# Patient Record
Sex: MALE | Race: WHITE | NOT HISPANIC OR LATINO | Employment: OTHER | URBAN - METROPOLITAN AREA
[De-identification: names, ages, dates, MRNs, and addresses within clinical notes are randomized per-mention and may not be internally consistent; named-entity substitution may affect disease eponyms.]

---

## 2018-01-01 ENCOUNTER — HOSPITAL ENCOUNTER (INPATIENT)
Facility: HOSPITAL | Age: 78
LOS: 1 days | DRG: 296 | End: 2018-12-30
Attending: EMERGENCY MEDICINE | Admitting: INTERNAL MEDICINE
Payer: MEDICARE

## 2018-01-01 ENCOUNTER — OFFICE VISIT (OUTPATIENT)
Dept: OPTOMETRY | Facility: CLINIC | Age: 78
End: 2018-01-01
Payer: MEDICARE

## 2018-01-01 VITALS
TEMPERATURE: 95 F | BODY MASS INDEX: 24.34 KG/M2 | SYSTOLIC BLOOD PRESSURE: 108 MMHG | OXYGEN SATURATION: 100 % | HEIGHT: 70 IN | WEIGHT: 170 LBS | DIASTOLIC BLOOD PRESSURE: 70 MMHG

## 2018-01-01 DIAGNOSIS — E87.20 ACIDEMIA: ICD-10-CM

## 2018-01-01 DIAGNOSIS — Z71.89 GOALS OF CARE, COUNSELING/DISCUSSION: ICD-10-CM

## 2018-01-01 DIAGNOSIS — R41.82 ALTERED MENTAL STATUS: ICD-10-CM

## 2018-01-01 DIAGNOSIS — J96.90 RESPIRATORY FAILURE, UNSPECIFIED CHRONICITY, UNSPECIFIED WHETHER WITH HYPOXIA OR HYPERCAPNIA: ICD-10-CM

## 2018-01-01 DIAGNOSIS — R56.9 SEIZURE IN RESPONSE TO ACUTE EVENT: ICD-10-CM

## 2018-01-01 DIAGNOSIS — E87.20 LACTIC ACIDOSIS: ICD-10-CM

## 2018-01-01 DIAGNOSIS — H11.31 SUBCONJUNCTIVAL HEMORRHAGE OF RIGHT EYE: Primary | ICD-10-CM

## 2018-01-01 DIAGNOSIS — F10.929 ALCOHOLIC INTOXICATION WITH COMPLICATION: ICD-10-CM

## 2018-01-01 DIAGNOSIS — G93.40 ACUTE ENCEPHALOPATHY: ICD-10-CM

## 2018-01-01 DIAGNOSIS — J96.90 RESPIRATORY FAILURE REQUIRING INTUBATION: ICD-10-CM

## 2018-01-01 DIAGNOSIS — E87.29 HIGH ANION GAP METABOLIC ACIDOSIS: ICD-10-CM

## 2018-01-01 DIAGNOSIS — I46.9 CARDIAC ARREST: Primary | ICD-10-CM

## 2018-01-01 LAB
ALBUMIN SERPL BCP-MCNC: 2.6 G/DL
ALBUMIN SERPL BCP-MCNC: 2.6 G/DL
ALBUMIN SERPL BCP-MCNC: 3.1 G/DL
ALLENS TEST: ABNORMAL
ALP SERPL-CCNC: 63 U/L
ALP SERPL-CCNC: 75 U/L
ALP SERPL-CCNC: 99 U/L
ALT SERPL W/O P-5'-P-CCNC: 35 U/L
ALT SERPL W/O P-5'-P-CCNC: 36 U/L
ALT SERPL W/O P-5'-P-CCNC: 48 U/L
AMPHET+METHAMPHET UR QL: NEGATIVE
ANION GAP SERPL CALC-SCNC: 11 MMOL/L
ANION GAP SERPL CALC-SCNC: 13 MMOL/L
ANION GAP SERPL CALC-SCNC: 20 MMOL/L
ANION GAP SERPL CALC-SCNC: 9 MMOL/L
ANISOCYTOSIS BLD QL SMEAR: SLIGHT
APTT BLDCRRT: 28.2 SEC
AST SERPL-CCNC: 102 U/L
AST SERPL-CCNC: 71 U/L
AST SERPL-CCNC: 77 U/L
BACTERIA #/AREA URNS AUTO: ABNORMAL /HPF
BARBITURATES UR QL SCN>200 NG/ML: NEGATIVE
BASOPHILS # BLD AUTO: 0.02 K/UL
BASOPHILS # BLD AUTO: 0.07 K/UL
BASOPHILS NFR BLD: 0.8 %
BASOPHILS NFR BLD: 1 %
BASOPHILS NFR BLD: 1.1 %
BENZODIAZ UR QL SCN>200 NG/ML: NEGATIVE
BILIRUB DIRECT SERPL-MCNC: 0.6 MG/DL
BILIRUB SERPL-MCNC: 1 MG/DL
BILIRUB SERPL-MCNC: 1 MG/DL
BILIRUB SERPL-MCNC: 1.4 MG/DL
BILIRUB UR QL STRIP: NEGATIVE
BUN SERPL-MCNC: 11 MG/DL
BUN SERPL-MCNC: 11 MG/DL
BUN SERPL-MCNC: 12 MG/DL
BUN SERPL-MCNC: 13 MG/DL
BZE UR QL SCN: NEGATIVE
CA-I BLDV-SCNC: 1.24 MMOL/L
CALCIUM SERPL-MCNC: 7.8 MG/DL
CALCIUM SERPL-MCNC: 7.9 MG/DL
CALCIUM SERPL-MCNC: 8.2 MG/DL
CALCIUM SERPL-MCNC: 8.7 MG/DL
CANNABINOIDS UR QL SCN: NEGATIVE
CHLORIDE SERPL-SCNC: 101 MMOL/L
CHLORIDE SERPL-SCNC: 102 MMOL/L
CHLORIDE SERPL-SCNC: 104 MMOL/L
CHLORIDE SERPL-SCNC: 104 MMOL/L
CK SERPL-CCNC: 332 U/L
CLARITY UR REFRACT.AUTO: ABNORMAL
CO2 SERPL-SCNC: 16 MMOL/L
CO2 SERPL-SCNC: 20 MMOL/L
CO2 SERPL-SCNC: 21 MMOL/L
CO2 SERPL-SCNC: 21 MMOL/L
COLOR UR AUTO: YELLOW
CREAT SERPL-MCNC: 0.9 MG/DL
CREAT SERPL-MCNC: 0.9 MG/DL
CREAT SERPL-MCNC: 1 MG/DL
CREAT SERPL-MCNC: 1.1 MG/DL
CREAT UR-MCNC: 21 MG/DL
DELSYS: ABNORMAL
DIFFERENTIAL METHOD: ABNORMAL
EOSINOPHIL # BLD AUTO: 0 K/UL
EOSINOPHIL # BLD AUTO: 0.1 K/UL
EOSINOPHIL NFR BLD: 0 %
EOSINOPHIL NFR BLD: 0 %
EOSINOPHIL NFR BLD: 0.9 %
ERYTHROCYTE [DISTWIDTH] IN BLOOD BY AUTOMATED COUNT: 13.1 %
ERYTHROCYTE [DISTWIDTH] IN BLOOD BY AUTOMATED COUNT: 13.2 %
ERYTHROCYTE [DISTWIDTH] IN BLOOD BY AUTOMATED COUNT: 13.3 %
ERYTHROCYTE [SEDIMENTATION RATE] IN BLOOD BY WESTERGREN METHOD: 14 MM/H
ERYTHROCYTE [SEDIMENTATION RATE] IN BLOOD BY WESTERGREN METHOD: 20 MM/H
ERYTHROCYTE [SEDIMENTATION RATE] IN BLOOD BY WESTERGREN METHOD: 28 MM/H
ERYTHROCYTE [SEDIMENTATION RATE] IN BLOOD BY WESTERGREN METHOD: 30 MM/H
EST. GFR  (AFRICAN AMERICAN): >60 ML/MIN/1.73 M^2
EST. GFR  (NON AFRICAN AMERICAN): >60 ML/MIN/1.73 M^2
ETHANOL SERPL-MCNC: 114 MG/DL
ETHANOL UR-MCNC: 106 MG/DL
FIO2: 100
FIO2: 40
FIO2: 40
FIO2: 50
GLUCOSE SERPL-MCNC: 103 MG/DL
GLUCOSE SERPL-MCNC: 127 MG/DL
GLUCOSE SERPL-MCNC: 90 MG/DL
GLUCOSE SERPL-MCNC: 94 MG/DL
GLUCOSE UR QL STRIP: ABNORMAL
HCO3 UR-SCNC: 15.5 MMOL/L (ref 24–28)
HCO3 UR-SCNC: 16.5 MMOL/L (ref 24–28)
HCO3 UR-SCNC: 17.5 MMOL/L (ref 24–28)
HCO3 UR-SCNC: 19.6 MMOL/L (ref 24–28)
HCO3 UR-SCNC: 21.1 MMOL/L (ref 24–28)
HCT VFR BLD AUTO: 41.9 %
HCT VFR BLD AUTO: 44.3 %
HCT VFR BLD AUTO: 45.9 %
HGB BLD-MCNC: 14.3 G/DL
HGB BLD-MCNC: 14.4 G/DL
HGB BLD-MCNC: 14.6 G/DL
HGB UR QL STRIP: ABNORMAL
HYALINE CASTS UR QL AUTO: 0 /LPF
IMM GRANULOCYTES # BLD AUTO: 0.02 K/UL
IMM GRANULOCYTES # BLD AUTO: 0.28 K/UL
IMM GRANULOCYTES # BLD AUTO: ABNORMAL K/UL
IMM GRANULOCYTES NFR BLD AUTO: 1.1 %
IMM GRANULOCYTES NFR BLD AUTO: 3.1 %
IMM GRANULOCYTES NFR BLD AUTO: ABNORMAL %
INR PPP: 1
KETONES UR QL STRIP: NEGATIVE
LACTATE SERPL-SCNC: 4.9 MMOL/L
LACTATE SERPL-SCNC: 5.3 MMOL/L
LACTATE SERPL-SCNC: 6.7 MMOL/L
LEUKOCYTE ESTERASE UR QL STRIP: NEGATIVE
LYMPHOCYTES # BLD AUTO: 0.3 K/UL
LYMPHOCYTES # BLD AUTO: 3 K/UL
LYMPHOCYTES NFR BLD: 15.8 %
LYMPHOCYTES NFR BLD: 22 %
LYMPHOCYTES NFR BLD: 34 %
MAGNESIUM SERPL-MCNC: 1.3 MG/DL
MAGNESIUM SERPL-MCNC: 1.7 MG/DL
MAGNESIUM SERPL-MCNC: 2.6 MG/DL
MCH RBC QN AUTO: 31.5 PG
MCH RBC QN AUTO: 32.1 PG
MCH RBC QN AUTO: 32.6 PG
MCHC RBC AUTO-ENTMCNC: 31.8 G/DL
MCHC RBC AUTO-ENTMCNC: 32.5 G/DL
MCHC RBC AUTO-ENTMCNC: 34.1 G/DL
MCV RBC AUTO: 103 FL
MCV RBC AUTO: 94 FL
MCV RBC AUTO: 97 FL
METHADONE UR QL SCN>300 NG/ML: NEGATIVE
MICROSCOPIC COMMENT: ABNORMAL
MIN VOL: 12.4
MIN VOL: 14.6
MODE: ABNORMAL
MONOCYTES # BLD AUTO: 0.1 K/UL
MONOCYTES # BLD AUTO: 0.4 K/UL
MONOCYTES NFR BLD: 4.4 %
MONOCYTES NFR BLD: 7.4 %
MONOCYTES NFR BLD: 8 %
NEUTROPHILS # BLD AUTO: 1.4 K/UL
NEUTROPHILS # BLD AUTO: 5.1 K/UL
NEUTROPHILS NFR BLD: 55 %
NEUTROPHILS NFR BLD: 56.8 %
NEUTROPHILS NFR BLD: 74.6 %
NEUTS BAND NFR BLD MANUAL: 14 %
NITRITE UR QL STRIP: NEGATIVE
NRBC BLD-RTO: 0 /100 WBC
OPIATES UR QL SCN: NEGATIVE
PCO2 BLDA: 27.4 MMHG (ref 35–45)
PCO2 BLDA: 47.7 MMHG (ref 35–45)
PCO2 BLDA: 51 MMHG (ref 35–45)
PCO2 BLDA: 54.3 MMHG (ref 35–45)
PCO2 BLDA: 77.2 MMHG (ref 35–45)
PCP UR QL SCN>25 NG/ML: NEGATIVE
PEEP: 5
PH SMN: 6.94 [PH] (ref 7.35–7.45)
PH SMN: 7.12 [PH] (ref 7.35–7.45)
PH SMN: 7.17 [PH] (ref 7.35–7.45)
PH SMN: 7.22 [PH] (ref 7.35–7.45)
PH SMN: 7.41 [PH] (ref 7.35–7.45)
PH UR STRIP: 6 [PH] (ref 5–8)
PHOSPHATE SERPL-MCNC: 1.1 MG/DL
PHOSPHATE SERPL-MCNC: 3.7 MG/DL
PIP: 25
PIP: 26
PLATELET # BLD AUTO: 112 K/UL
PLATELET # BLD AUTO: 121 K/UL
PLATELET # BLD AUTO: 150 K/UL
PLATELET BLD QL SMEAR: ABNORMAL
PMV BLD AUTO: 10 FL
PMV BLD AUTO: 10.3 FL
PMV BLD AUTO: 9.9 FL
PO2 BLDA: 22 MMHG (ref 40–60)
PO2 BLDA: 24 MMHG (ref 40–60)
PO2 BLDA: 407 MMHG (ref 80–100)
PO2 BLDA: 73 MMHG (ref 80–100)
PO2 BLDA: 83 MMHG (ref 80–100)
POC BE: -14 MMOL/L
POC BE: -16 MMOL/L
POC BE: -7 MMOL/L
POC BE: -7 MMOL/L
POC BE: -9 MMOL/L
POC SATURATED O2: 100 % (ref 95–100)
POC SATURATED O2: 27 % (ref 95–100)
POC SATURATED O2: 28 % (ref 95–100)
POC SATURATED O2: 92 % (ref 95–100)
POC SATURATED O2: 95 % (ref 95–100)
POC TCO2: 17 MMOL/L (ref 23–27)
POC TCO2: 18 MMOL/L (ref 23–27)
POC TCO2: 19 MMOL/L (ref 23–27)
POC TCO2: 21 MMOL/L (ref 24–29)
POC TCO2: 23 MMOL/L (ref 24–29)
POTASSIUM SERPL-SCNC: 3.8 MMOL/L
POTASSIUM SERPL-SCNC: 3.8 MMOL/L
POTASSIUM SERPL-SCNC: 3.9 MMOL/L
POTASSIUM SERPL-SCNC: 4 MMOL/L
PROCALCITONIN SERPL IA-MCNC: 8.58 NG/ML
PROT SERPL-MCNC: 5.2 G/DL
PROT SERPL-MCNC: 5.2 G/DL
PROT SERPL-MCNC: 6.4 G/DL
PROT UR QL STRIP: ABNORMAL
PROTHROMBIN TIME: 10.7 SEC
PROVIDER CREDENTIALS: ABNORMAL
PROVIDER CREDENTIALS: ABNORMAL
PROVIDER NOTIFIED: ABNORMAL
PROVIDER NOTIFIED: ABNORMAL
RBC # BLD AUTO: 4.45 M/UL
RBC # BLD AUTO: 4.48 M/UL
RBC # BLD AUTO: 4.57 M/UL
RBC #/AREA URNS AUTO: 87 /HPF (ref 0–4)
SAMPLE: ABNORMAL
SITE: ABNORMAL
SODIUM SERPL-SCNC: 134 MMOL/L
SODIUM SERPL-SCNC: 135 MMOL/L
SODIUM SERPL-SCNC: 136 MMOL/L
SODIUM SERPL-SCNC: 137 MMOL/L
SP GR UR STRIP: 1 (ref 1–1.03)
SP02: 96
SP02: 98
SQUAMOUS #/AREA URNS AUTO: 2 /HPF
TOXICOLOGY INFORMATION: ABNORMAL
TROPONIN I SERPL DL<=0.01 NG/ML-MCNC: 1.04 NG/ML
TROPONIN I SERPL DL<=0.01 NG/ML-MCNC: 1.76 NG/ML
TROPONIN I SERPL DL<=0.01 NG/ML-MCNC: <0.006 NG/ML
URN SPEC COLLECT METH UR: ABNORMAL
VANCOMYCIN SERPL-MCNC: 24.4 UG/ML
VT: 450
VT: 470
WBC # BLD AUTO: 1.05 K/UL
WBC # BLD AUTO: 1.9 K/UL
WBC # BLD AUTO: 8.91 K/UL
WBC #/AREA URNS AUTO: 0 /HPF (ref 0–5)

## 2018-01-01 PROCEDURE — 36600 WITHDRAWAL OF ARTERIAL BLOOD: CPT

## 2018-01-01 PROCEDURE — 99202 OFFICE O/P NEW SF 15 MIN: CPT | Mod: PBBFAC | Performed by: OPTOMETRIST

## 2018-01-01 PROCEDURE — 27100171 HC OXYGEN HIGH FLOW UP TO 24 HOURS

## 2018-01-01 PROCEDURE — 83735 ASSAY OF MAGNESIUM: CPT

## 2018-01-01 PROCEDURE — 63600175 PHARM REV CODE 636 W HCPCS: Performed by: STUDENT IN AN ORGANIZED HEALTH CARE EDUCATION/TRAINING PROGRAM

## 2018-01-01 PROCEDURE — 99292 CRITICAL CARE ADDL 30 MIN: CPT | Mod: ,,, | Performed by: EMERGENCY MEDICINE

## 2018-01-01 PROCEDURE — 82550 ASSAY OF CK (CPK): CPT

## 2018-01-01 PROCEDURE — 94761 N-INVAS EAR/PLS OXIMETRY MLT: CPT

## 2018-01-01 PROCEDURE — 99291 PR CRITICAL CARE, E/M 30-74 MINUTES: ICD-10-PCS | Mod: ,,, | Performed by: CLINICAL NURSE SPECIALIST

## 2018-01-01 PROCEDURE — 25000003 PHARM REV CODE 250: Performed by: CLINICAL NURSE SPECIALIST

## 2018-01-01 PROCEDURE — 83605 ASSAY OF LACTIC ACID: CPT

## 2018-01-01 PROCEDURE — 92002 INTRM OPH EXAM NEW PATIENT: CPT | Mod: S$GLB,,, | Performed by: OPTOMETRIST

## 2018-01-01 PROCEDURE — 99291 CRITICAL CARE FIRST HOUR: CPT | Mod: 25

## 2018-01-01 PROCEDURE — 93010 EKG 12-LEAD: ICD-10-PCS | Mod: ,,, | Performed by: INTERNAL MEDICINE

## 2018-01-01 PROCEDURE — 82803 BLOOD GASES ANY COMBINATION: CPT

## 2018-01-01 PROCEDURE — 63600175 PHARM REV CODE 636 W HCPCS: Performed by: CLINICAL NURSE SPECIALIST

## 2018-01-01 PROCEDURE — 99291 PR CRITICAL CARE, E/M 30-74 MINUTES: ICD-10-PCS | Mod: ,,, | Performed by: EMERGENCY MEDICINE

## 2018-01-01 PROCEDURE — 84484 ASSAY OF TROPONIN QUANT: CPT | Mod: 91

## 2018-01-01 PROCEDURE — 20000000 HC ICU ROOM

## 2018-01-01 PROCEDURE — 99900035 HC TECH TIME PER 15 MIN (STAT)

## 2018-01-01 PROCEDURE — 84484 ASSAY OF TROPONIN QUANT: CPT

## 2018-01-01 PROCEDURE — 80048 BASIC METABOLIC PNL TOTAL CA: CPT | Mod: 91

## 2018-01-01 PROCEDURE — 84100 ASSAY OF PHOSPHORUS: CPT

## 2018-01-01 PROCEDURE — 85025 COMPLETE CBC W/AUTO DIFF WBC: CPT

## 2018-01-01 PROCEDURE — 99291 CRITICAL CARE FIRST HOUR: CPT | Mod: ,,, | Performed by: EMERGENCY MEDICINE

## 2018-01-01 PROCEDURE — 25500020 PHARM REV CODE 255: Performed by: EMERGENCY MEDICINE

## 2018-01-01 PROCEDURE — 95951 PR EEG MONITORING/VIDEORECORD: ICD-10-PCS | Mod: 26,,, | Performed by: PSYCHIATRY & NEUROLOGY

## 2018-01-01 PROCEDURE — 25000003 PHARM REV CODE 250: Performed by: EMERGENCY MEDICINE

## 2018-01-01 PROCEDURE — 27000221 HC OXYGEN, UP TO 24 HOURS

## 2018-01-01 PROCEDURE — 25000003 PHARM REV CODE 250

## 2018-01-01 PROCEDURE — 80320 DRUG SCREEN QUANTALCOHOLS: CPT

## 2018-01-01 PROCEDURE — 80307 DRUG TEST PRSMV CHEM ANLYZR: CPT

## 2018-01-01 PROCEDURE — 99292 PR CRITICAL CARE, ADDL 30 MIN: ICD-10-PCS | Mod: ,,, | Performed by: EMERGENCY MEDICINE

## 2018-01-01 PROCEDURE — 80202 ASSAY OF VANCOMYCIN: CPT

## 2018-01-01 PROCEDURE — S0028 INJECTION, FAMOTIDINE, 20 MG: HCPCS | Performed by: CLINICAL NURSE SPECIALIST

## 2018-01-01 PROCEDURE — 27200966 HC CLOSED SUCTION SYSTEM

## 2018-01-01 PROCEDURE — 93010 ELECTROCARDIOGRAM REPORT: CPT | Mod: ,,, | Performed by: INTERNAL MEDICINE

## 2018-01-01 PROCEDURE — 85027 COMPLETE CBC AUTOMATED: CPT

## 2018-01-01 PROCEDURE — 99291 CRITICAL CARE FIRST HOUR: CPT | Mod: ,,, | Performed by: CLINICAL NURSE SPECIALIST

## 2018-01-01 PROCEDURE — 96375 TX/PRO/DX INJ NEW DRUG ADDON: CPT

## 2018-01-01 PROCEDURE — 83605 ASSAY OF LACTIC ACID: CPT | Mod: 91

## 2018-01-01 PROCEDURE — 84132 ASSAY OF SERUM POTASSIUM: CPT

## 2018-01-01 PROCEDURE — 95951 PR EEG MONITORING/VIDEORECORD: CPT | Mod: 26,,, | Performed by: PSYCHIATRY & NEUROLOGY

## 2018-01-01 PROCEDURE — 85610 PROTHROMBIN TIME: CPT

## 2018-01-01 PROCEDURE — 99900026 HC AIRWAY MAINTENANCE (STAT)

## 2018-01-01 PROCEDURE — 85014 HEMATOCRIT: CPT

## 2018-01-01 PROCEDURE — 93005 ELECTROCARDIOGRAM TRACING: CPT

## 2018-01-01 PROCEDURE — 85007 BL SMEAR W/DIFF WBC COUNT: CPT

## 2018-01-01 PROCEDURE — 96365 THER/PROPH/DIAG IV INF INIT: CPT

## 2018-01-01 PROCEDURE — 80053 COMPREHEN METABOLIC PANEL: CPT | Mod: 91

## 2018-01-01 PROCEDURE — 84295 ASSAY OF SERUM SODIUM: CPT

## 2018-01-01 PROCEDURE — 80053 COMPREHEN METABOLIC PANEL: CPT

## 2018-01-01 PROCEDURE — 85730 THROMBOPLASTIN TIME PARTIAL: CPT

## 2018-01-01 PROCEDURE — 84145 PROCALCITONIN (PCT): CPT

## 2018-01-01 PROCEDURE — 25000003 PHARM REV CODE 250: Performed by: STUDENT IN AN ORGANIZED HEALTH CARE EDUCATION/TRAINING PROGRAM

## 2018-01-01 PROCEDURE — 94002 VENT MGMT INPAT INIT DAY: CPT

## 2018-01-01 PROCEDURE — 83735 ASSAY OF MAGNESIUM: CPT | Mod: 91

## 2018-01-01 PROCEDURE — 81001 URINALYSIS AUTO W/SCOPE: CPT

## 2018-01-01 PROCEDURE — 82330 ASSAY OF CALCIUM: CPT

## 2018-01-01 PROCEDURE — 80048 BASIC METABOLIC PNL TOTAL CA: CPT

## 2018-01-01 PROCEDURE — 99999 PR PBB SHADOW E&M-NEW PATIENT-LVL II: CPT | Mod: PBBFAC,,, | Performed by: OPTOMETRIST

## 2018-01-01 PROCEDURE — 80076 HEPATIC FUNCTION PANEL: CPT

## 2018-01-01 RX ORDER — FAMOTIDINE 10 MG/ML
20 INJECTION INTRAVENOUS 2 TIMES DAILY
Status: DISCONTINUED | OUTPATIENT
Start: 2018-01-01 | End: 2018-01-01

## 2018-01-01 RX ORDER — SODIUM CHLORIDE 0.9 % (FLUSH) 0.9 %
3 SYRINGE (ML) INJECTION
Status: DISCONTINUED | OUTPATIENT
Start: 2018-01-01 | End: 2018-01-01

## 2018-01-01 RX ORDER — PROPOFOL 10 MG/ML
10 INJECTION, EMULSION INTRAVENOUS CONTINUOUS
Status: DISCONTINUED | OUTPATIENT
Start: 2018-01-01 | End: 2018-12-31 | Stop reason: HOSPADM

## 2018-01-01 RX ORDER — ONDANSETRON 8 MG/1
8 TABLET, ORALLY DISINTEGRATING ORAL EVERY 8 HOURS PRN
Status: DISCONTINUED | OUTPATIENT
Start: 2018-01-01 | End: 2018-01-01

## 2018-01-01 RX ORDER — NOREPINEPHRINE BITARTRATE/D5W 4MG/250ML
PLASTIC BAG, INJECTION (ML) INTRAVENOUS
Status: COMPLETED
Start: 2018-01-01 | End: 2018-01-01

## 2018-01-01 RX ORDER — CHLORHEXIDINE GLUCONATE ORAL RINSE 1.2 MG/ML
15 SOLUTION DENTAL 2 TIMES DAILY
Status: DISCONTINUED | OUTPATIENT
Start: 2018-01-01 | End: 2018-01-01

## 2018-01-01 RX ORDER — LORAZEPAM 2 MG/ML
1 INJECTION INTRAMUSCULAR
Status: DISCONTINUED | OUTPATIENT
Start: 2018-01-01 | End: 2018-01-01

## 2018-01-01 RX ORDER — ASPIRIN 300 MG/1
300 SUPPOSITORY RECTAL
Status: COMPLETED | OUTPATIENT
Start: 2018-01-01 | End: 2018-01-01

## 2018-01-01 RX ORDER — LORAZEPAM 2 MG/ML
4 INJECTION INTRAMUSCULAR
Status: DISCONTINUED | OUTPATIENT
Start: 2018-01-01 | End: 2018-12-31 | Stop reason: HOSPADM

## 2018-01-01 RX ORDER — LORAZEPAM 2 MG/ML
4 INJECTION INTRAMUSCULAR
Status: COMPLETED | OUTPATIENT
Start: 2018-01-01 | End: 2018-01-01

## 2018-01-01 RX ORDER — LORAZEPAM 2 MG/ML
INJECTION INTRAMUSCULAR
Status: DISPENSED
Start: 2018-01-01 | End: 2018-01-01

## 2018-01-01 RX ORDER — DEXMEDETOMIDINE HYDROCHLORIDE 4 UG/ML
0.2 INJECTION, SOLUTION INTRAVENOUS CONTINUOUS
Status: DISCONTINUED | OUTPATIENT
Start: 2018-01-01 | End: 2018-01-01

## 2018-01-01 RX ORDER — MAGNESIUM SULFATE HEPTAHYDRATE 40 MG/ML
2 INJECTION, SOLUTION INTRAVENOUS
Status: DISCONTINUED | OUTPATIENT
Start: 2018-01-01 | End: 2018-01-01

## 2018-01-01 RX ORDER — POTASSIUM CHLORIDE 20 MEQ/15ML
40 SOLUTION ORAL
Status: DISCONTINUED | OUTPATIENT
Start: 2018-01-01 | End: 2018-01-01

## 2018-01-01 RX ORDER — LEVETIRACETAM 5 MG/ML
500 INJECTION INTRAVASCULAR EVERY 12 HOURS
Status: DISCONTINUED | OUTPATIENT
Start: 2018-01-01 | End: 2018-12-31 | Stop reason: HOSPADM

## 2018-01-01 RX ORDER — MORPHINE SULFATE/0.9% NACL/PF 1 MG/ML
1 PLASTIC BAG, INJECTION (ML) INTRAVENOUS CONTINUOUS
Status: DISCONTINUED | OUTPATIENT
Start: 2018-01-01 | End: 2018-01-01

## 2018-01-01 RX ORDER — NOREPINEPHRINE BITARTRATE/D5W 4MG/250ML
0.02 PLASTIC BAG, INJECTION (ML) INTRAVENOUS CONTINUOUS
Status: DISCONTINUED | OUTPATIENT
Start: 2018-01-01 | End: 2018-12-31 | Stop reason: HOSPADM

## 2018-01-01 RX ORDER — MAGNESIUM SULFATE HEPTAHYDRATE 40 MG/ML
4 INJECTION, SOLUTION INTRAVENOUS
Status: DISCONTINUED | OUTPATIENT
Start: 2018-01-01 | End: 2018-01-01

## 2018-01-01 RX ORDER — FOLIC ACID 1 MG/1
1 TABLET ORAL DAILY
Status: DISCONTINUED | OUTPATIENT
Start: 2018-01-01 | End: 2018-01-01

## 2018-01-01 RX ORDER — THIAMINE HCL 100 MG
100 TABLET ORAL DAILY
Status: DISCONTINUED | OUTPATIENT
Start: 2018-01-01 | End: 2018-01-01

## 2018-01-01 RX ORDER — ACETAMINOPHEN 650 MG/20.3ML
650 LIQUID ORAL EVERY 4 HOURS PRN
Status: DISCONTINUED | OUTPATIENT
Start: 2018-01-01 | End: 2018-01-01

## 2018-01-01 RX ORDER — LEVETIRACETAM 15 MG/ML
1500 INJECTION INTRAVASCULAR
Status: COMPLETED | OUTPATIENT
Start: 2018-01-01 | End: 2018-01-01

## 2018-01-01 RX ORDER — SODIUM CHLORIDE 9 MG/ML
1000 INJECTION, SOLUTION INTRAVENOUS
Status: COMPLETED | OUTPATIENT
Start: 2018-01-01 | End: 2018-01-01

## 2018-01-01 RX ADMIN — FOLIC ACID: 5 INJECTION, SOLUTION INTRAMUSCULAR; INTRAVENOUS; SUBCUTANEOUS at 08:12

## 2018-01-01 RX ADMIN — PROPOFOL 40 MCG/KG/MIN: 10 INJECTION, EMULSION INTRAVENOUS at 03:12

## 2018-01-01 RX ADMIN — Medication 0.12 MG: at 09:12

## 2018-01-01 RX ADMIN — Medication 100 MG: at 08:12

## 2018-01-01 RX ADMIN — LEVETIRACETAM 500 MG: 5 INJECTION INTRAVENOUS at 03:12

## 2018-01-01 RX ADMIN — PIPERACILLIN AND TAZOBACTAM 4.5 G: 4; .5 INJECTION, POWDER, LYOPHILIZED, FOR SOLUTION INTRAVENOUS; PARENTERAL at 08:12

## 2018-01-01 RX ADMIN — ACETAMINOPHEN 650 MG: 650 SOLUTION ORAL at 03:12

## 2018-01-01 RX ADMIN — LORAZEPAM 2 MG/HR: 2 INJECTION INTRAMUSCULAR; INTRAVENOUS at 02:12

## 2018-01-01 RX ADMIN — LORAZEPAM 4 MG: 2 INJECTION, SOLUTION INTRAMUSCULAR; INTRAVENOUS at 04:12

## 2018-01-01 RX ADMIN — FOLIC ACID 1 MG: 1 TABLET ORAL at 08:12

## 2018-01-01 RX ADMIN — VANCOMYCIN HYDROCHLORIDE 1250 MG: 10 INJECTION, POWDER, LYOPHILIZED, FOR SOLUTION INTRAVENOUS at 10:12

## 2018-01-01 RX ADMIN — PROPOFOL 20 MCG/KG/MIN: 10 INJECTION, EMULSION INTRAVENOUS at 01:12

## 2018-01-01 RX ADMIN — SODIUM CHLORIDE 1000 ML: 0.9 INJECTION, SOLUTION INTRAVENOUS at 04:12

## 2018-01-01 RX ADMIN — CHLORHEXIDINE GLUCONATE 0.12% ORAL RINSE 15 ML: 1.2 LIQUID ORAL at 08:12

## 2018-01-01 RX ADMIN — PIPERACILLIN AND TAZOBACTAM 4.5 G: 4; .5 INJECTION, POWDER, LYOPHILIZED, FOR SOLUTION INTRAVENOUS; PARENTERAL at 12:12

## 2018-01-01 RX ADMIN — FAMOTIDINE 20 MG: 10 INJECTION, SOLUTION INTRAVENOUS at 09:12

## 2018-01-01 RX ADMIN — CHLORHEXIDINE GLUCONATE 0.12% ORAL RINSE 15 ML: 1.2 LIQUID ORAL at 09:12

## 2018-01-01 RX ADMIN — LEVETIRACETAM INJECTION 1500 MG: 15 INJECTION INTRAVENOUS at 04:12

## 2018-01-01 RX ADMIN — SODIUM CHLORIDE 1000 ML: 0.9 INJECTION, SOLUTION INTRAVENOUS at 03:12

## 2018-01-01 RX ADMIN — Medication 1 MG/HR: at 11:12

## 2018-01-01 RX ADMIN — IOHEXOL 100 ML: 350 INJECTION, SOLUTION INTRAVENOUS at 04:12

## 2018-01-01 RX ADMIN — DEXMEDETOMIDINE HYDROCHLORIDE 0.2 MCG/KG/HR: 100 INJECTION, SOLUTION, CONCENTRATE INTRAVENOUS at 06:12

## 2018-01-01 RX ADMIN — FAMOTIDINE 20 MG: 10 INJECTION, SOLUTION INTRAVENOUS at 08:12

## 2018-01-01 RX ADMIN — SODIUM CHLORIDE, SODIUM LACTATE, POTASSIUM CHLORIDE, AND CALCIUM CHLORIDE 1000 ML: .6; .31; .03; .02 INJECTION, SOLUTION INTRAVENOUS at 08:12

## 2018-01-01 RX ADMIN — ASPIRIN 300 MG: 300 SUPPOSITORY RECTAL at 03:12

## 2018-01-01 RX ADMIN — PROPOFOL 10 MCG/KG/MIN: 10 INJECTION, EMULSION INTRAVENOUS at 11:12

## 2018-01-01 RX ADMIN — LORAZEPAM 0.5 MG/HR: 2 INJECTION INTRAMUSCULAR; INTRAVENOUS at 11:12

## 2018-01-01 RX ADMIN — SODIUM CHLORIDE, SODIUM LACTATE, POTASSIUM CHLORIDE, AND CALCIUM CHLORIDE 1000 ML: .6; .31; .03; .02 INJECTION, SOLUTION INTRAVENOUS at 11:12

## 2018-12-20 NOTE — PROGRESS NOTES
HPI     Last eye exam: 5 years ago  Patient complain of redness OD x 3 days (stable). Patient denies eye pain,   floaters/flashes and headaches. Patient says symptoms occurred 5 years ago   with the right eye, was advised to use Systane PRN-OU. No changes with   vision OD.     Last edited by Edward Schuler MA on 12/20/2018 10:42 AM. (History)            Assessment /Plan     For exam results, see Encounter Report.    Subconjunctival hemorrhage of right eye            1.  Educated pt on CLEM.  Patient deferred dilated exam--will schedule return appointment.  Recommend patient follow-up with PCP for check-up.

## 2018-12-29 PROBLEM — R56.9 SEIZURE IN RESPONSE TO ACUTE EVENT: Status: ACTIVE | Noted: 2018-01-01

## 2018-12-29 PROBLEM — Z71.89 GOALS OF CARE, COUNSELING/DISCUSSION: Status: ACTIVE | Noted: 2018-01-01

## 2018-12-29 PROBLEM — I46.9 CARDIAC ARREST: Status: ACTIVE | Noted: 2018-01-01

## 2018-12-29 PROBLEM — F10.939 ALCOHOL WITHDRAWAL: Status: ACTIVE | Noted: 2018-01-01

## 2018-12-29 PROBLEM — I46.9 PEA (PULSELESS ELECTRICAL ACTIVITY): Status: ACTIVE | Noted: 2018-01-01

## 2018-12-29 PROBLEM — G93.40 ACUTE ENCEPHALOPATHY: Status: ACTIVE | Noted: 2018-01-01

## 2018-12-29 PROBLEM — J96.90 RESPIRATORY FAILURE REQUIRING INTUBATION: Status: ACTIVE | Noted: 2018-01-01

## 2018-12-29 NOTE — HPI
Mr Balbuena is a 78 y.o male presented to ER after PEA arrest. Cardiology evaluated for further evaluation.     Informations obtained from EMS, ER Staff and his son. Looks like he was in found down in a bar and was resuscitated by EMS for about 20 min for PEA then he had ROSC. Patient is heavy alcoholic per his son with drinking everyday. Per his son, he smokes marijuana. His son mentioned that patient has no significant PMH ane he takes no medications at home.     In ER currently he is intubated, dilated pupil non reactive to light. Rectal Temp 34.4 C, PH 6.9 and EKG with ST depression in V2-V6 with about 1 mm ST elevation in AVR. Posterior EKG with no significant ST elevation in V4-V6.

## 2018-12-29 NOTE — ASSESSMENT & PLAN NOTE
- Heavy alcoholic critically sick patient with no significant cardiac history per his son.   - Rectal Temp 34.4, PH 6.9, lactate >10 and ST changes with No STEMI criteria on EKG.  - Currently intubated with dilated pupil non reactive to light.  - SBP>170 and HR >100 (sinus tach).  - Bedside Limited echo with difficult window showed no significant effusion and hyperdynamic LV.     Recommendations:    - No role for Cardiac intervention at this time, we recommend supportive treatment.  - Admit to Medical ICU.  - Recommend complete 2 D echo in am  - Further evaluation per MICU team.   - Discussed with Interventional Cardiology Staff Dr Ty Tillman.   - Discussed with ED Staff.

## 2018-12-29 NOTE — ED NOTES
The patient was brought to the ER today by  EMS unit #[id number]. The patient was at a bar and got up to go to the restroom. His friends found him sitting up in the restroom, unresponsive. 911 was called at 1419. EMS made patient contact at 1426. EMS states pt was initially in PEA, then was briefly in asystole, then back to PEA before gaining a rhythm of A fib rvr at 1444. Pt got 3 Epis and 0.5 of Atropine. cbg 88. Pt did not receive any paralytics.

## 2018-12-29 NOTE — PROGRESS NOTES
Patient brought up from ED intubated with a size 7.0 ETT, secured 24@ lips. Patient placed on vent with documented settings. Sats are 96%. Will continue to monitor.

## 2018-12-29 NOTE — ED PROVIDER NOTES
Encounter Date: 12/29/2018       History     Chief Complaint   Patient presents with    Cardiac Arrest     Mr. Balbuena is a 78 y.o. male with h/o alcoholism here s/p cardiac arrest. Per EMS, patient was found at the bar in the bathroom, sitting up and unresponsive. EMS called at 1419.  On EMS arrival, he was in PEA arrest.. Chest compressions were performed. Patient was given Epi x 3 with ROSC. Began to become bradycardic so atropine x1 was given by EMS. Intubated by EMS w/ 7.0 ET tube w/o sedation drugs needed. Suspected approximately 20 minutes of down time. Further history is limited as pt is unresponsive and intubated.      The history is provided by the EMS personnel.     Review of patient's allergies indicates:  No Known Allergies  No past medical history on file.  No past surgical history on file.  Family History   Problem Relation Age of Onset    Amblyopia Neg Hx     Blindness Neg Hx     Cataracts Neg Hx     Glaucoma Neg Hx     Macular degeneration Neg Hx     Retinal detachment Neg Hx     Strabismus Neg Hx      Social History     Tobacco Use    Smoking status: Not on file   Substance Use Topics    Alcohol use: Not on file    Drug use: Not on file     Review of Systems   Unable to perform ROS: Acuity of condition       Physical Exam     Initial Vitals   BP Pulse Resp Temp SpO2   12/29/18 1502 12/29/18 1502 12/29/18 1502 12/29/18 1519 12/29/18 1515   97/61 (!) 120 (!) 24 (!) 94.2 °F (34.6 °C) 100 %      MAP       --                Physical Exam    Nursing note and vitals reviewed.  Constitutional: He appears well-developed and well-nourished. He is not diaphoretic.   HENT:   Head: Normocephalic and atraumatic.   Right Ear: External ear normal.   Left Ear: External ear normal.   Nose: Nose normal.   Mouth/Throat: Oropharynx is clear and moist.   ET tube in place.   Eyes: Conjunctivae are normal. No scleral icterus.   Pupils 5-6 mm and nonreactive.   Neck: Neck supple.   Cardiovascular: Regular  rhythm, normal heart sounds and intact distal pulses. Tachycardia present.    Pulmonary/Chest:   Mechanically ventilated. Clear breath sounds bilaterally. Marked concavity of chest.   Abdominal: Soft. He exhibits no distension.   Musculoskeletal: He exhibits no edema.   Neurological: He is unresponsive. He exhibits abnormal muscle tone. GCS eye subscore is 1. GCS verbal subscore is 1. GCS motor subscore is 1.   Occasionally moves BLE in what appears to be myoclonic jerks. Clonus present.   Skin: Skin is warm. Capillary refill takes 2 to 3 seconds.         ED Course   Procedures  Labs Reviewed   CBC W/ AUTO DIFFERENTIAL - Abnormal; Notable for the following components:       Result Value    RBC 4.48 (*)      (*)     MCH 32.6 (*)     MCHC 31.8 (*)     Immature Granulocytes 3.1 (*)     Immature Grans (Abs) 0.28 (*)     All other components within normal limits   BASIC METABOLIC PANEL - Abnormal; Notable for the following components:    CO2 16 (*)     Glucose 127 (*)     Anion Gap 20 (*)     All other components within normal limits   ALCOHOL,MEDICAL (ETHANOL) - Abnormal; Notable for the following components:    Alcohol, Medical, Serum 114 (*)     All other components within normal limits   TOXICOLOGY SCREEN, URINE, RANDOM (COMPLIANCE) - Abnormal; Notable for the following components:    Alcohol, Urine 106 (*)     Creatinine, Random Ur 21.0 (*)     All other components within normal limits    Narrative:     Preferred Collection Type->Urine, Clean Catch   URINALYSIS, REFLEX TO URINE CULTURE - Abnormal; Notable for the following components:    Appearance, UA Cloudy (*)     Protein, UA 1+ (*)     Glucose, UA 1+ (*)     Occult Blood UA 3+ (*)     All other components within normal limits    Narrative:     Preferred Collection Type->Urine, Clean Catch   URINALYSIS MICROSCOPIC - Abnormal; Notable for the following components:    RBC, UA 87 (*)     All other components within normal limits    Narrative:     Preferred  Collection Type->Urine, Clean Catch   LACTIC ACID, PLASMA - Abnormal; Notable for the following components:    Lactate (Lactic Acid) 6.7 (*)     All other components within normal limits   ISTAT PROCEDURE - Abnormal; Notable for the following components:    POC PH 6.937 (*)     POC PCO2 77.2 (*)     POC PO2 407 (*)     POC HCO3 16.5 (*)     POC TCO2 19 (*)     All other components within normal limits   ISTAT PROCEDURE - Abnormal; Notable for the following components:    POC PH 7.120 (*)     POC PCO2 47.7 (*)     POC HCO3 15.5 (*)     POC SATURATED O2 92 (*)     POC TCO2 17 (*)     All other components within normal limits   MAGNESIUM   TROPONIN I   APTT   CALCIUM, IONIZED   PROTIME-INR   CALCIUM, IONIZED    Narrative:     add on CAION #[id number] per Dr. Selby @  12/29/2018  16:43   add on PT and APTT #[id number] per Dr. Selby @  12/29/2018  16:46    PROTIME-INR    Narrative:     add on CAION #[id number] per Dr. Selby @  12/29/2018  16:43   add on PT and APTT #[id number] per Dr. Selby @  12/29/2018  16:46    APTT    Narrative:     add on CAION #[id number] per Dr. Selby @  12/29/2018  16:43   add on PT and APTT #[id number] per Dr. Selby @  12/29/2018  16:46      EKG Readings: (Independently Interpreted)   Initial Reading: Possible STEMI.   Sinust tachycardia, rate 122. Marked ST depressions in V3-V6 as well as  II, III, aVF with ST elevation in aVR. Concern for posterior MI.         Imaging Results          X-Ray Chest AP Portable (In process)                CTA STROKE MULTI-PHASE (Final result)  Result time 12/29/18 18:13:49    Final result by Gigi Burch MD (12/29/18 18:13:49)                 Impression:      No high-grade stenosis or major vessel occlusion.    Scattered areas of calcified and noncalcified atherosclerosis throughout bilateral carotid arteries with no area of hemodynamically significant stenosis.    Patchy ground-glass densities and scattered nodules throughout bilateral upper lung zones,  more prominent on the right.  Likely multifocal infectious process.    Pleural based lesion in the posterior right upper lung measuring 1.3 x 0.9 cm.  Neoplastic process cannot be definitively excluded without prior imaging for comparison.    Important findings were discussed with Dr. Lomas, ER physician, at 17:45 on 12/29/2018 by Dr. Henson by telephone on behalf of Dr. Burch.    Electronically signed by resident: Franco Henson  Date:    12/29/2018  Time:    17:23    Electronically signed by: Gigi Burch MD  Date:    12/29/2018  Time:    18:13             Narrative:    EXAMINATION:  CTA STROKE MULTI-PHASE    CLINICAL HISTORY:  post code;    TECHNIQUE:  CT angiogram was performed from the level of the abisai to the top of the head following the IV administration of 100mL of Omnipaque 350.   Sagittal and coronal reconstructions and maximum intensity projection reconstructions were performed.  Two additional phases of immediate post-contrast CTA images were performed through the head alone.    COMPARISON:  CT head 12/29/2018.    FINDINGS:  Intracranial Compartment:    For more complete evaluation of intracranial contents refer to CT noncontrast head 12/29/2018 at 15:54. On CTA multiphase imaging, there is no significant edema or intracranial mass. No definite intracranial hemorrhage. Patient is intubated with an NG tube.    Skull/Extracranial Contents (limited evaluation): No fracture. Mastoid air cells are clear.  Mild mucosal thickening in the ethmoid air sinuses.  Orbits unremarkable.  Patient is intubated and there is an NG tube.    Non-Vascular Structures of the Neck/Thoracic Inlet (limited evaluation): Patchy ground-glass densities and scattered micronodules throughout bilateral upper lung zones more prominent on the right.  Findings are favored to represent a multifocal infectious etiology.  No significant pleural fluid.  Abutting the pleura in the posterior aspect of the apical segment of the right upper lung  there is a 1.3 x 0.9 cm soft tissue nodule for which neoplastic process cannot be definitively excluded without prior imaging for comparison.    Aorta: Normal 3 vessel arch.    Extracranial carotid circulation: Prominent noncalcified atherosclerosis in the midportion of the left common carotid artery with less than 50% stenosis.  Prominent calcified atherosclerosis at the left carotid bulb.  Scattered calcified noncalcified atherosclerosis throughout the right common carotid artery and in the cervical portion of the right ICA.  No hemodynamically significant stenosis, aneurysmal dilatation, or dissection.    Extracranial vertebral circulation: Left dominant vertebral arterial system.  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    Intracranial Arteries: Calcific atherosclerosis throughout the supraclinoid portions of bilateral ICAs.  No focal high-grade stenosis, occlusion, or aneurysm.    Venous structures (limited evaluation): Normal.                               CT Head Without Contrast (Final result)     Abnormal  Result time 12/29/18 16:49:20    Final result by Victorino Padilla MD (12/29/18 16:49:20)                 Impression:      Diffuse loss of gray-white differentiation throughout the supratentorial and infratentorial cerebral parenchyma suspicious for acute anoxic brain injury.    Increased attenuation in the area of the basilar artery as well as in the proximal right MCA, suspicious for acute thrombosis.    Subtle hypodensity in the sandi right of midline, likely remote infarct.    This report was flagged in Epic as abnormal.    Important findings were discussed with Dr. Selby, ER physician, at 16:20 on 12/29/2018 by Dr. Henson by telephone on behalf of Dr. Padilla.    Electronically signed by resident: Franco  April  Date:    12/29/2018  Time:    16:09    Electronically signed by: Victorino Padilla MD  Date:    12/29/2018  Time:    16:49             Narrative:    EXAMINATION:  CT HEAD WITHOUT CONTRAST    CLINICAL HISTORY:  post code;    TECHNIQUE:  Low dose axial CT images obtained throughout the head without intravenous contrast. Sagittal and coronal reconstructions were performed.    COMPARISON:  None.    FINDINGS:  Intracranial compartment:    There is diffuse loss of gray-white differentiation throughout the throughout the supra and infratentorial cerebral parenchyma.  No significant edema or evidence of herniation.  There is increased attenuation in the basilar artery as well as in the area of the proximal right MCA, suggestive of possible acute thrombosis.  There is a 0.9 cm subtle hypodensity in the sandi right of midline, likely remote infarct.  No parenchymal mass or hemorrhage.    Mild generalized cerebral volume loss.  No hydrocephalus.  No extra-axial blood or fluid collections.    Skull/extracranial contents (limited evaluation): No fracture. Mastoid air cells are clear.  Mild mucosal thickening in the ethmoid air cells.  Remaining paranasal sinuses are unremarkable.  Patient is intubated and there is an NG tube present.                               X-Ray Chest 1 View (Final result)  Result time 12/29/18 15:49:11    Final result by Victorino Padilla MD (12/29/18 15:49:11)                 Impression:      Right middle lung zone and left lower lung zone airspace opacities, suspicious for edema or infectious/inflammatory disease.      Electronically signed by: Victorino Padilla MD  Date:    12/29/2018  Time:    15:49             Narrative:    EXAMINATION:  XR CHEST 1 VIEW    CLINICAL HISTORY:  Altered mental status, unspecified    TECHNIQUE:  Single frontal view of the chest was performed.    COMPARISON:  None    FINDINGS:  Endotracheal tube tip projects in the midthoracic trachea.    Esophagogastric catheter courses  below the diaphragm with the tip out of the field of view.    Ill-defined right middle lung zone airspace opacities, and ill-defined left lower lung zone airspace opacities.  No effusion or pneumothorax.    No acute bone abnormality.                                 Medical Decision Making:   History:   I obtained history from: someone other than patient and EMS provider.  Old Medical Records: I decided to obtain old medical records.  Old Records Summarized: records from clinic visits.  Independently Interpreted Test(s):   I have ordered and independently interpreted X-rays - see summary below.       <> Summary of X-Ray Reading(s): CXR viewed. No acute infiltrate, effusion or PTX on my read. ET tube and OG tube in appropriate positions.  I have ordered and independently interpreted EKG Reading(s) - see prior notes  Clinical Tests:   Lab Tests: Ordered and Reviewed  Radiological Study: Ordered and Reviewed  Medical Tests: Ordered and Reviewed  ED Management:  77 y/o male here s/p cardiac arrest. Mildly ypertensive and tachycardic on arrival with pulse. EKG concerning for posterior MI, especially w/ significant elevation in aVR. Posterior EKG with 0 - 0.25 mm elevation. OK ASA given. While evaluating EKG, cardiology emergently consulted with concern for STEMI, who promptly evaluated patient. They report posterior EKG does not meet STEMI criteria; they performed bedside TTE which showed mildly hyperdynamic mild decreased LV function. Given lactate >10 w/ pH 6.9, they do not think he is currently a candidate for cath lab at this time. They recommend admission to MICU. Discussed with MICU, who evaluated patient and planned to admit. While awaiting admission, CT head performed; called by radiology resident with concerning findings for dense R MCA and basilar artery. Given these findings, code stroke paged. [facility] neuro evaluated patient and recommend CTA multi-phase, which did not show LVO. Initially troponin normal. Lab  work with metabolic acidosis. Considered inserting cooling catheter in ED, but patient already with therapeutic hypothermia in ED; will defer this to MICU. Patient admitted to MICU.  Other:   I have discussed this case with another health care provider.              Attending Attestation:         Attending Critical Care:   Critical Care Times:   Direct Patient Care (initial evaluation, reassessments, and time considering the case)................................................................20 minutes.   Additional History from reviewing old medical records or taking additional history from the family, EMS, PCP, etc.......................5 minutes.   Ordering, Reviewing, and Interpreting Diagnostic Studies...............................................................................................................15 minutes.   Documentation..................................................................................................................................................................................15 minutes.   Consultation with other Physicians. .................................................................................................................................................20 minutes.   Consultation with the patient's family directly relating to the patient's condition, care, and DNR status (when patient unable)......5 minutes.   ==============================================================  · Total Critical Care Time - exclusive of procedural time: 80 minutes.  ==============================================================  Critical care was necessary to treat or prevent imminent or life-threatening deterioration of the following conditions: cardiac arrest.   Critical care was time spent personally by me on the following activities: obtaining history from patient or relative, examination of patient, review of old charts, ordering lab, x-rays, and/or EKG, development of treatment  plan with patient or relative, ordering and performing treatments and interventions, evaluation of patient's response to treatment, discussion with consultants, interpretation of cardiac measurements and re-evaluation of patient's conition.   Critical Care Condition: critical                  Clinical Impression:     1. Cardiac arrest    2. Altered mental status    3. Acute encephalopathy    4. Respiratory failure requiring intubation    5. Alcoholic intoxication with complication    6. High anion gap metabolic acidosis    7. Respiratory failure, unspecified chronicity, unspecified whether with hypoxia or hypercapnia    8. Lactic acidosis    9. Acidemia            Disposition:   Disposition: Admitted  Condition: Critical                        Richy Selby MD  12/29/18 3122

## 2018-12-29 NOTE — PROGRESS NOTES
Patient arrived by EMS intubated with 7.0 ETT 24 at the lips. Patient is unresponsive at this time Placed on ventilator AC/VC 14 450 peep 5 at 100% O2. Obtained abg with lactate and electrolytes. Adjusted ventilator to resp rate 20 and O2 to 50% post abg results. Will continue to monitor pt

## 2018-12-29 NOTE — CONSULTS
[facility]  Interventional Cardiology  Consult Note    Patient Name: Ivan Balbuena  MRN: [id number]  Admission Date: 12/29/2018  Hospital Length of Stay: 0 days  Code Status: No Order   Attending Provider: Richy Selby MD  Consulting Provider: Tomasa Dill MD  Primary Care Physician: Primary Doctor No  Principal Problem:<principal problem not specified>    Patient information was obtained from patient and ER records.     Inpatient consult to Cardiology  Consult performed by: Tomasa Dill MD  Consult ordered by: Richy Selby MD        Subjective:     Chief Complaint:  PEA arrest      HPI:  Mr Balbuena is a 78 y.o male presented to ER after PEA arrest. Cardiology evaluated for further evaluation.     Informations obtained from EMS, ER Staff and his son. Looks like he was in found down in a bar and was resuscitated by EMS for about 20 min for PEA then he had ROSC. Patient is heavy alcoholic per his son with drinking everyday. Per his son, he smokes marijuana. His son mentioned that patient has no significant PMH ane he takes no medications at home.     In ER currently he is intubated, dilated pupil non reactive to light. Rectal Temp 34.4 C, PH 6.9 and EKG with ST depression in V2-V6 with about 1 mm ST elevation in AVR. Posterior EKG with no significant ST elevation in V4-V6.    No past medical history on file.    No past surgical history on file.    Review of patient's allergies indicates:  No Known Allergies      (Not in a hospital admission)  Family History     None        Tobacco Use    Smoking status: Not on file   Substance and Sexual Activity    Alcohol use: Not on file    Drug use: Not on file    Sexual activity: Not on file     Review of Systems   Reason unable to perform ROS: unable to assess.     Objective:     Vital Signs (Most Recent):  Temp: (!) 94.2 °F (34.6 °C) (12/29/18 1519)  Pulse: (!) 120 (12/29/18 1525)  Resp: 18 (12/29/18 1525)  BP: (!) 184/94  (12/29/18 1525)  SpO2: 100 % (12/29/18 1525) Vital Signs (24h Range):  Temp:  [94.2 °F (34.6 °C)] 94.2 °F (34.6 °C)  Pulse:  [120-122] 120  Resp:  [8-24] 18  SpO2:  [100 %] 100 %  BP: ()/(51-94) 184/94        There is no height or weight on file to calculate BMI.    SpO2: 100 %  O2 Device (Oxygen Therapy): ventilator    No intake or output data in the 24 hours ending 12/29/18 1601    Lines/Drains/Airways     Drain                 NG/OG Tube 12/29/18 1526 18 Fr. Right mouth less than 1 day         Urethral Catheter 12/29/18 1529 16 Fr. less than 1 day          Airway                 Airway - Non-Surgical 12/29/18 1505 Endotracheal Tube less than 1 day          Peripheral Intravenous Line                 Peripheral IV - Single Lumen 12/29/18 1504 Left Antecubital less than 1 day         Peripheral IV - Single Lumen 12/29/18 1504 Right Forearm less than 1 day                Physical Exam   Eyes:   Dilated non reactive to light   Cardiovascular:   tachycardic   Neurological:   Intubated       Significant Labs:   ABG:   Recent Labs   Lab 12/29/18  1518   PH 6.937*   PCO2 77.2*   HCO3 16.5*   POCSATURATED 100   BE -16   , Blood Culture: No results for input(s): LABBLOO in the last 48 hours., BMP:   Recent Labs   Lab 12/29/18  1513   *      K 3.8      CO2 16*   BUN 11   CREATININE 1.1   CALCIUM 8.7   MG 2.6    and CMP   Recent Labs   Lab 12/29/18  1513      K 3.8      CO2 16*   *   BUN 11   CREATININE 1.1   CALCIUM 8.7   ANIONGAP 20*   ESTGFRAFRICA >60.0   EGFRNONAA >60.0         Assessment and Plan:     PEA (Pulseless electrical activity)    - Heavy alcoholic critically sick patient with no significant cardiac history per his son.   - Rectal Temp 34.4, PH 6.9, lactate >10 and ST changes with No STEMI criteria on EKG.  - Currently intubated with dilated pupil non reactive to light.  - SBP>170 and HR >100 (sinus tach).  - Bedside Limited echo with difficult windows showed no  significant effusion and hyperdynamic LV.     Recommendations:    - No role for Cardiac intervention at this time, we recommend supportive treatment.  - Recommend admitting to Medical ICU.  - Recommend complete 2 D echo in am  - Further evaluation per MICU team.   - Discussed with Interventional Cardiology Staff Dr Ty Tillman.   - Discussed with ED Staff.      Thank you for your consult, please call cariology for any question.     Tomasa Dill MD  Cardiology Fellow  Pager: [phone]

## 2018-12-29 NOTE — SUBJECTIVE & OBJECTIVE
No past medical history on file.    No past surgical history on file.    Review of patient's allergies indicates:  No Known Allergies      (Not in a hospital admission)  Family History     None        Tobacco Use    Smoking status: Not on file   Substance and Sexual Activity    Alcohol use: Not on file    Drug use: Not on file    Sexual activity: Not on file     Review of Systems   Reason unable to perform ROS: unable to assess.     Objective:     Vital Signs (Most Recent):  Temp: (!) 94.2 °F (34.6 °C) (12/29/18 1519)  Pulse: (!) 120 (12/29/18 1525)  Resp: 18 (12/29/18 1525)  BP: (!) 184/94 (12/29/18 1525)  SpO2: 100 % (12/29/18 1525) Vital Signs (24h Range):  Temp:  [94.2 °F (34.6 °C)] 94.2 °F (34.6 °C)  Pulse:  [120-122] 120  Resp:  [8-24] 18  SpO2:  [100 %] 100 %  BP: ()/(51-94) 184/94        There is no height or weight on file to calculate BMI.    SpO2: 100 %  O2 Device (Oxygen Therapy): ventilator    No intake or output data in the 24 hours ending 12/29/18 1601    Lines/Drains/Airways     Drain                 NG/OG Tube 12/29/18 1526 18 Fr. Right mouth less than 1 day         Urethral Catheter 12/29/18 1529 16 Fr. less than 1 day          Airway                 Airway - Non-Surgical 12/29/18 1505 Endotracheal Tube less than 1 day          Peripheral Intravenous Line                 Peripheral IV - Single Lumen 12/29/18 1504 Left Antecubital less than 1 day         Peripheral IV - Single Lumen 12/29/18 1504 Right Forearm less than 1 day                Physical Exam   Eyes:   Dilated non reactive to light   Cardiovascular:   tachycardic   Neurological:   Intubated       Significant Labs:   ABG:   Recent Labs   Lab 12/29/18  1518   PH 6.937*   PCO2 77.2*   HCO3 16.5*   POCSATURATED 100   BE -16   , Blood Culture: No results for input(s): LABBLOO in the last 48 hours., BMP:   Recent Labs   Lab 12/29/18  1513   *      K 3.8      CO2 16*   BUN 11   CREATININE 1.1   CALCIUM 8.7   MG 2.6     and CMP   Recent Labs   Lab 12/29/18  1513      K 3.8      CO2 16*   *   BUN 11   CREATININE 1.1   CALCIUM 8.7   ANIONGAP 20*   ESTGFRAFRICA >60.0   EGFRNONAA >60.0

## 2018-12-30 NOTE — ASSESSMENT & PLAN NOTE
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  -- NCC was consulted but do not think this was a neurological event  -- A CTA Stroke showed no high-grade stenosis or major vessel occlusion.

## 2018-12-30 NOTE — CONSULTS
Patient seen by Critical Care Medicine at 4pm but then NCC consulted,  Admitted to IP by CCM. To ICUU approx 18:15.   See H+P from today

## 2018-12-30 NOTE — PROGRESS NOTES
Pt assessed and seen with rhythmic jerking movements on all 4 extremities in unison. Dr. Edmond White notified of situation. No interventions ordered. MD directed to continue to monitor pt and notify for any further changes.

## 2018-12-30 NOTE — HPI
Mr Balbuena is a 79 yo male with no significant PMH except alcohol and Marijuana abuse according to his son. He was found down in a bar today in PEA arrest. EMS report approximatley 20 mins of resuscitation until ROSC. The patient was intubated in the field. In ED the patient was found to be hypothermic(34.4), acidotic (6.9), and unresponsive.EKG showed ST depression in V2-V6 with about 1 mm ST elevation in AVR. Posterior EKG with no significant ST elevation in V4-V6. He was seen by Cardiology who did limited bedside echo that showed no significant effusion and hyperdynamic LV. They did not recommend any cardiac interventions. 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 A CTA Stroke showed no high-grade stenosis or major vessel occlusion. The patient did appear to be seizing and was loaded with Keppra and given Lorazepam.   Of note, the patient lives alone in an apartment in [place] and he goes to 3-4 local bars daily and drinks 1-2 beers at each one. He also smokes Marijuana daily. The patient has one son and two daughters. His son has recently been treated for multiple myeloma( Dr Ragsdale) . One daughter lives in [facility] and the other is in [place].

## 2018-12-30 NOTE — ASSESSMENT & PLAN NOTE
PEA arrest of unknown etiology  -- down time ~20 mins  -- Maintain normothermia. Place cooling blanket if patient's temperature goes above 36 degrees

## 2018-12-30 NOTE — ASSESSMENT & PLAN NOTE
At risk for alcohol withdrawal (level 114 in ED)  -- Banana bag  -- Thiamine and folic acid daily  -- Monitor ANGELA-AR q shift

## 2018-12-30 NOTE — PROGRESS NOTES
[facility]  Critical Care Medicine  Progress Note    Patient Name: Ivan Balbuena  MRN: [id number]  Admission Date: 12/29/2018  Hospital Length of Stay: 1 days  Code Status: Full Code  Attending Provider: Cynthia Ribeiro MD  Primary Care Provider: Primary Doctor No   Principal Problem: Cardiac arrest    Subjective:     HPI:  Mr Balbuena is a 79 yo male with no significant PMH except alcohol and Marijuana abuse according to his son. He was found down in a bar today in PEA arrest. EMS report approximatley 20 mins of resuscitation until ROSC. The patient was intubated in the field. In ED the patient was found to be hypothermic(34.4), acidotic (6.9), and unresponsive.EKG showed ST depression in V2-V6 with about 1 mm ST elevation in AVR. Posterior EKG with no significant ST elevation in V4-V6. He was seen by Cardiology who did limited bedside echo that showed no significant effusion and hyperdynamic LV. They did not recommend any cardiac interventions. 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 A CTA Stroke showed no high-grade stenosis or major vessel occlusion. The patient did appear to be seizing and was loaded with Keppra and given Lorazepam.   Of note, the patient lives alone in an apartment in [place] and he goes to 3-4 local bars daily and drinks 1-2 beers at each one. He also smokes Marijuana daily. The patient has one son and two daughters. His son has recently been treated for multiple myeloma( Dr Ragsdale) . One daughter lives in [facility] and the other is in [place].         Hospital/ICU Course:  Patient admitted to [facility]. EEG reportedly showed status there fore patient was placed on Propofol. Will follow up with Epileptology today and work with family on goals of  care    History reviewed. No pertinent past medical history.    History reviewed. No pertinent surgical history.    Review of patient's allergies indicates:  No Known Allergies    Family History     None        Tobacco Use    Smoking status: Not on file   Substance and Sexual Activity    Alcohol use: Not on file    Drug use: Not on file    Sexual activity: Not on file      Review of Systems   Unable to perform ROS: Intubated     Objective:     Vital Signs (Most Recent):  Temp: 99 °F (37.2 °C) (12/30/18 0701)  Pulse: 85 (12/30/18 0800)  Resp: (!) 30 (12/30/18 0800)  BP: (!) 86/57 (12/30/18 0800)  SpO2: 100 % (12/30/18 0800) Vital Signs (24h Range):  Temp:  [94.2 °F (34.6 °C)-99.9 °F (37.7 °C)] 99 °F (37.2 °C)  Pulse:  [] 85  Resp:  [8-37] 30  SpO2:  [90 %-100 %] 100 %  BP: ()/() 86/57   Weight: 77.1 kg (170 lb)  Body mass index is 24.39 kg/m².      Intake/Output Summary (Last 24 hours) at 12/30/2018 0857  Last data filed at 12/30/2018 0800  Gross per 24 hour   Intake 2756.11 ml   Output 629 ml   Net 2127.11 ml       Physical Exam   Constitutional: He appears well-developed. He appears cachectic. He has a sickly appearance. He is intubated.   HENT:   Head: Normocephalic.   Eyes: Pupils are equal, round, and reactive to light.   Neck: Trachea normal.   Cardiovascular: Regular rhythm and normal pulses. Tachycardia present.   Pulmonary/Chest: He is intubated. He has rhonchi in the right lower field and the left lower field.   Abdominal: Soft. Normal appearance. Bowel sounds are decreased.   Neurological: He is unresponsive. GCS eye subscore is 1. GCS verbal subscore is 1. GCS motor subscore is 1.   No cough or gag  Pupils 2 and reactive  Slight withdrawal to pain on upper extremities    Skin: Skin is warm and dry.   Nursing note and vitals reviewed.      Vents:  Vent Mode: A/C (12/30/18 0742)  Ventilator Initiated: Yes (12/29/18 1518)  Set Rate: 30 bmp (12/30/18 0742)  Vt Set: 450 mL (12/30/18  0742)  Pressure Support: 0 cmH20 (12/29/18 1720)  PEEP/CPAP: 5 cmH20 (12/30/18 0742)  Oxygen Concentration (%): 41 (12/30/18 0800)  Peak Airway Pressure: 26 cmH2O (12/30/18 0742)  Plateau Pressure: 0 cmH20 (12/30/18 0742)  Total Ve: 14.1 mL (12/30/18 0742)  F/VT Ratio<105 (RSBI): (!) 63.69 (12/30/18 0742)  Lines/Drains/Airways     Drain                 NG/OG Tube 12/29/18 1526 18 Fr. Right mouth less than 1 day         Urethral Catheter 12/29/18 1529 16 Fr. less than 1 day          Airway                 Airway - Non-Surgical 12/29/18 1505 Endotracheal Tube less than 1 day          Peripheral Intravenous Line                 Peripheral IV - Single Lumen 12/29/18 1504 Right Antecubital less than 1 day         Peripheral IV - Single Lumen 12/29/18 1504 Right Forearm less than 1 day         Peripheral IV - Single Lumen 12/29/18 1900 Anterior;Left Forearm less than 1 day              Significant Labs:    CBC/Anemia Profile:  Recent Labs   Lab 12/29/18  1513 12/30/18  0003 12/30/18  0405   WBC 8.91 1.05* 1.90*   HGB 14.6 14.4 14.3   HCT 45.9 44.3 41.9    121* 112*   * 97 94   RDW 13.1 13.2 13.3        Chemistries:  Recent Labs   Lab 12/29/18  1513 12/29/18 2008 12/30/18  0003 12/30/18  0405    135* 136 134*   K 3.8 4.0 3.8 3.9    102 104 104   CO2 16* 20* 21* 21*   BUN 11 11 12 13   CREATININE 1.1 0.9 0.9 1.0   CALCIUM 8.7 8.2* 7.8* 7.9*   ALBUMIN  --  3.1* 2.6* 2.6*   PROT  --  6.4 5.2* 5.2*   BILITOT  --  1.0 1.0 1.4*   ALKPHOS  --  99 75 63   ALT  --  48* 36 35   AST  --  102* 77* 71*   MG 2.6 1.7  --  1.3*   PHOS  --  3.7  --  1.1*       All pertinent labs within the past 24 hours have been reviewed.    Significant Imaging: I have reviewed and interpreted all pertinent imaging results/findings within the past 24 hours.      AB  Recent Labs   Lab 12/30/18  0342   PH 7.413   PO2 73*   PCO2 27.4*   HCO3 17.5*   BE -7     Assessment/Plan:     Neuro   Seizure in response to acute event     "Concern for seizures in ED  - loaded with Keppra   -Giiven prn Lorazepam.   -- EEG ordered and reportedly showed status. Now on Propofol. 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  -- NCC was consulted but do not think this was a neurological event  -- A CTA Stroke showed no high-grade stenosis or major vessel occlusion.      Psychiatric   Alcohol withdrawal    At risk for alcohol withdrawal (level 114 in ED)  -- Banana bag  -- Thiamine and folic acid daily  -- Monitor CIWA-AR q shift       Pulmonary   Respiratory failure requiring intubation    Intubated in ED for airway protection  -- Wean vent as tolerated     Cardiac/Vascular   * Cardiac arrest    PEA arrest of unknown etiology  -- down time ~20 mins  -- Maintain normothermia. Place cooling blanket if patient's temperature goes above 36 degrees     PEA (Pulseless electrical activity)    See below     Other   Goals of care, counseling/discussion    Spoke with the son in ED. Prognosis is very poor after "the patient's hear stopped for 20 mins". We explained that his brain may be affected but we would continue to monitor him closely in ICU for the next several hours during which time his prognosis would become clearer. His daughters were on their way to the hospital. Code status and goals of care will be addressed when they arrive this morning        Critical Care Daily Checklist:    A: Awake: RASS Goal/Actual Goal:    Actual: Gutiérrez Agitation Sedation Scale (RASS): Unarousable   B: Spontaneous Breathing Trial Performed?     C: SAT & SBT Coordinated?  NA                    D: Delirium: CAM-ICU Overall CAM-ICU: Positive   E: Early Mobility Performed? No "   F: Feeding Goal:    Status:     Current Diet Order   Procedures    Diet NPO      AS: Analgesia/Sedation Propofol   T: Thromboembolic Prophylaxis SCDs   H: HOB > 300 Yes   U: Stress Ulcer Prophylaxis (if needed) Famotidine   G: Glucose Control prn   B: Bowel Function     I: Indwelling Catheter (Lines & Hines) Necessity NA   D: De-escalation of Antimicrobials/Pharmacotherapies Continue    Plan for the day/ETD Supportive Care    Code Status:  Family/Goals of Care: Full Code  Family meeting today     Critical Care Time: 60 minutes  Critical secondary to Patient has a condition that poses threat to life and bodily function: Cardiac arrest      Critical care was time spent personally by me on the following activities: development of treatment plan with patient or surrogate and bedside caregivers, discussions with consultants, evaluation of patient's response to treatment, examination of patient, ordering and performing treatments and interventions, ordering and review of laboratory studies, ordering and review of radiographic studies, pulse oximetry, re-evaluation of patient's condition. This critical care time did not overlap with that of any other provider or involve time for any procedures.     Fiona Winterbottom, APRN, CNS  Critical Care Medicine  [facility]

## 2018-12-30 NOTE — SUBJECTIVE & OBJECTIVE
No past medical history on file.    No past surgical history on file.    Review of patient's allergies indicates:  No Known Allergies    Family History     None        Tobacco Use    Smoking status: Not on file   Substance and Sexual Activity    Alcohol use: Not on file    Drug use: Not on file    Sexual activity: Not on file      Review of Systems   Unable to perform ROS: Intubated     Objective:     Vital Signs (Most Recent):  Temp: (!) 95.7 °F (35.4 °C) (12/29/18 1830)  Pulse: (!) 126 (12/29/18 1804)  Resp: (!) 37 (12/29/18 1804)  BP: (!) 166/101 (12/29/18 1804)  SpO2: 95 % (12/29/18 1804) Vital Signs (24h Range):  Temp:  [94.2 °F (34.6 °C)-95.7 °F (35.4 °C)] 95.7 °F (35.4 °C)  Pulse:  [102-126] 126  Resp:  [8-37] 37  SpO2:  [93 %-100 %] 95 %  BP: ()/() 166/101   Weight: 79.4 kg (175 lb)  Body mass index is 25.11 kg/m².      Intake/Output Summary (Last 24 hours) at 12/29/2018 1841  Last data filed at 12/29/2018 1829  Gross per 24 hour   Intake 0 ml   Output 100 ml   Net -100 ml       Physical Exam   Constitutional: He appears well-developed. He appears cachectic. He has a sickly appearance. He is intubated.   HENT:   Head: Normocephalic.   Eyes: Pupils are equal, round, and reactive to light.   Neck: Trachea normal.   Cardiovascular: Regular rhythm and normal pulses. Tachycardia present.   Pulmonary/Chest: He is intubated. He has rhonchi in the right lower field and the left lower field.   Abdominal: Soft. Normal appearance. Bowel sounds are decreased.   Neurological: He is unresponsive. GCS eye subscore is 1. GCS verbal subscore is 1. GCS motor subscore is 1.   Patient began to twitch/seize when a light was shone in his eyes  Pupils 3 and reactive   Skin: Skin is warm and dry.   Nursing note and vitals reviewed.      Vents:  Vent Mode: A/C (12/29/18 1753)  Ventilator Initiated: Yes (12/29/18 1518)  Set Rate: 20 bmp (12/29/18 1753)  Vt Set: 450 mL (12/29/18 1753)  Pressure Support: 0 cmH20  (12/29/18 1720)  PEEP/CPAP: 5 cmH20 (12/29/18 1753)  Oxygen Concentration (%): 50 (12/29/18 1804)  Peak Airway Pressure: 26 cmH2O (12/29/18 1753)  Plateau Pressure: 0 cmH20 (12/29/18 1753)  Total Ve: 17.8 mL (12/29/18 1753)  F/VT Ratio<105 (RSBI): (!) 59.13 (12/29/18 1720)  Lines/Drains/Airways     Drain                 NG/OG Tube 12/29/18 1526 18 Fr. Right mouth less than 1 day         Urethral Catheter 12/29/18 1529 16 Fr. less than 1 day          Airway                 Airway - Non-Surgical 12/29/18 1505 Endotracheal Tube less than 1 day          Peripheral Intravenous Line                 Peripheral IV - Single Lumen 12/29/18 1504 Right Antecubital less than 1 day         Peripheral IV - Single Lumen 12/29/18 1504 Right Forearm less than 1 day              Significant Labs:    CBC/Anemia Profile:  Recent Labs   Lab 12/29/18  1513   WBC 8.91   HGB 14.6   HCT 45.9      *   RDW 13.1        Chemistries:  Recent Labs   Lab 12/29/18  1513      K 3.8      CO2 16*   BUN 11   CREATININE 1.1   CALCIUM 8.7   MG 2.6       All pertinent labs within the past 24 hours have been reviewed.    Significant Imaging: I have reviewed and interpreted all pertinent imaging results/findings within the past 24 hours.

## 2018-12-30 NOTE — ED NOTES
CT notified of need for CT states unable to perform at this time s/t amount of contrast already given to patient, Dr Garvey notified, states to hold on CT and bring pt to ICU ready bed assigned.

## 2018-12-30 NOTE — PLAN OF CARE
Problem: Adult Inpatient Plan of Care  Goal: Plan of Care Review  Outcome: Ongoing (interventions implemented as appropriate)    No acute events throughout day. See vital signs and assessments in flowsheets. See below for updates on today's progress.     Pulmonary: AC 40% 5 PEEP. Plan to wean vent settings for withdrawal of care.     Cardiovascular: SR. Weaning levo off.     Neurological: RASS -5. Pupils fixed, pinpoint.     Gastrointestinal:     Genitourinary: Hines in place UO 25cc/hr    Endocrine: NA    Integumentary/Other: NA    Infusions: Ativan, Morpine, Levo infusing at ordered rates.     Patient progressing towards goals as tolerated. Plan to continue comfort care and withdrawal vent support. Plan of care communicated and reviewed with Ivan Sree and family. All concerns addressed. Will continue to monitor.

## 2018-12-30 NOTE — PROCEDURES
EEG reviewed from 21:36 on 12/29 to 9:06 on 12/30   Burst suppression with myoclonic epileptiform discharges noted. Myoclonus was controlled by 6 am. EEG continues to show a bursts suppression pattern.   Clinical impression - spontaneous Burst suppression with myoclonic epileptiform discharge carries a guarded prognosis in the context pf anoxic brain injury     Discussed with primary team      Kristian Durham MD   Epilepsy Division

## 2018-12-30 NOTE — ASSESSMENT & PLAN NOTE
"Spoke with the son in ED. Prognosis is very poor after "the patient's hear stopped for 20 mins". We explained that his brain may be affected but we would continue to monitor him closely in ICU for the next several hours during which time his prognosis would become clearer. His daughters were on their way to the hospital. Code status and goals of care will be addressed when they arrive this morning  "

## 2018-12-30 NOTE — SIGNIFICANT EVENT
Family Discussion    Patient family at bedside. I spoke with Epileptology for updates on seizures.   The family were updated on the patient's poor prognosis and that the patient had been having seizures overnight that were only suppressed when Propofol was added. We discussed that the patient had experienced a devastating neurological injury and that it was highly unlikely that he would not return back to his pre cardiac arrest state. The family gave me a document signed by the patient that laid out his wishes including comfort at the end of his life. The decision was mad to move towards comfort care and to attempt to mariel off the ventilator if this could be done without the patient having more seizures.

## 2018-12-30 NOTE — NURSING
Pt arrived to rm [id number]. Pt is intubated on AC 50% PEEP 5. Tachycardic and HTN. Pt is unresponsive with pinpoint and fixed pupils. Upper extremities contracted inward. F. Winterbottom notified of pt arrival. Will continue to monitor.

## 2018-12-30 NOTE — CARE UPDATE
Spoke to Dr Durham with epileptology to ask for an EEG. He approved and told me to call the Neuro critical care charge nurse to get an EEG cap. The nurse said they are not allowed to place caps for patients outside [facility].

## 2018-12-30 NOTE — HOSPITAL COURSE
Patient admitted to [facility]. EEG reportedly showed status there fore patient was placed on Propofol. Will follow up with Epileptology today and work with family on goals of care

## 2018-12-30 NOTE — PROCEDURES
DATE OF PROCEDURE:  12/29/2018    EEG NUMBER:  [id number]    REFERRING PHYSICIAN:  Cynthia Ribeiro M.D.    This EEG was performed to access for status epilepticus.    ICU EEG/VIDEO MONITORING REPORT    METHODOLOGY:  Electroencephalographic (EEG) is recorded with electrodes placed   according to the International 10-20 placement system.  Thirty two (32) channels   of digital signal (sampling rate of 512/sec), including T1 and T2, were   simultaneously recorded from the scalp and may include EKG, EMG, and/or eye   monitors.  Recording band pass was 0.1 to 512 Hz.  Digital video recording of   the patient is simultaneously recorded with the EEG.  The patient is instructed   to report clinical symptoms which may occur during the recording session.  EEG   and video recording are stored and archived in digital format.  Activation   procedures, which include photic stimulation, hyperventilation and instructing   patients to perform simple tasks, are done in selected patients.    The EEG is displayed on a monitor screen and can be reviewed using different   montages.  Computer-assisted analysis is employed to detect spike and   electrographic seizure activity.  The entire record is submitted for computer   analysis.  The entire recording is visually reviewed, and the times identified   by computer analysis as being spikes or seizures are reviewed again.    Compressed spectral analysis (CSA) is also performed on the activity recorded   from each individual channel.  This is displayed as a power display of   frequencies from 0 to 30 Hz over time.  The CSA is reviewed looking for   asymmetries in power between homologous areas of the scalp, then compared with   the original EEG recording.    2Checkout software was also utilized in the review of this study.  This software   suite analyzes the EEG recording in multiple domains.  Coherence and rhythmicity   are computed to identify EEG sections which may contain organized seizures.    Each  channel undergoes analysis to detect the presence of spike and sharp waves   which have special and morphological characteristics of epileptic activity.  The   routine EEG recording is converted from special into frequency domain.  This is   then displayed comparing homologous areas to identify areas of significant   asymmetry.  Algorithm to identify non-cortically generated artifact is used to   separate artifact from the EEG.    RECORDING TIMES:  Start on 12/29/2018 at hour 21 minute 36 second 31  Stop on 12/30/2018 at hour 7 minute 0 second 3  Restart on 12/30/2018 at hour 7 minute 0 second 22  Stop on 12/30/2018 at hour 9 minute 6 second 21  The total time of video EEG recording for this study was 11 hours and 28   minutes.    EEG FINDINGS:  The recording was obtained with a number of standard bipolar and   referential montages during comatose state.  In this state, the background was   consistent with a burst-suppression pattern.  The burst comprised an admixture   of frequencies and were intermixed with generalized epileptiform discharges,   which correlated with generalized myoclonus.  From hour 21 minute 58 second 21   to hour 23 minute 49 second 39, the EEG was unavailable for interpretation.    Subsequently, a burst-suppression pattern persisted.  In addition, prolonged   periods of generalized mixed theta and alpha range frequencies were also noted.    Spontaneous variability was appreciated.  Formal reactivity testing was not   performed.  There was a gradual reduction in the amplitude of the burst by hour   2 on 12/30/2018.  By hour 6 on 12/30/2018, there was resolution of the myoclonic   epileptiform discharges.    The EKG channel revealed sinus rhythm.    IMPRESSION:  This is an abnormal EEG during comatose state.  Spontaneous burst   suppression as well as generalized myoclonic epileptiform discharges were noted.    The myoclonic epileptiform discharges resolved by hour 6 on 12/30/2018.    CLINICAL  CORRELATION:  The patient is a 78-year-old male with a history of   anoxic brain injury who is currently maintained on levetiracetam and on   intravenous infusion of propofol.  This is an abnormal EEG during comatose   state.  A spontaneous burst-suppression along with myoclonic status epilepticus   was noted.  With the initiation of intravenous infusions of propofol, there was   a gradual reduction in the amplitude of the discharges and by hour 6 on   12/30/2018, there was resolution of the myoclonic epileptiform discharges and   clinical myoclonus.  A burst-suppression pattern ensued which persisted until   the end of the study.  These findings were relayed to the primary team.      FAK/KELLEY  dd: 12/30/2018 09:29:31 (CST)  td: 12/30/2018 10:03:21 (CST)  Doc ID   #[id number]  Job ID #[id number]    CC:

## 2018-12-30 NOTE — NURSING
Delio NP notified of patient remaining > 37C despite cooling blanket application, ice pack application, and tylenol administration.

## 2018-12-30 NOTE — SUBJECTIVE & OBJECTIVE
History reviewed. No pertinent past medical history.    History reviewed. No pertinent surgical history.    Review of patient's allergies indicates:  No Known Allergies    Family History     None        Tobacco Use    Smoking status: Not on file   Substance and Sexual Activity    Alcohol use: Not on file    Drug use: Not on file    Sexual activity: Not on file      Review of Systems   Unable to perform ROS: Intubated     Objective:     Vital Signs (Most Recent):  Temp: 99 °F (37.2 °C) (12/30/18 0701)  Pulse: 85 (12/30/18 0800)  Resp: (!) 30 (12/30/18 0800)  BP: (!) 86/57 (12/30/18 0800)  SpO2: 100 % (12/30/18 0800) Vital Signs (24h Range):  Temp:  [94.2 °F (34.6 °C)-99.9 °F (37.7 °C)] 99 °F (37.2 °C)  Pulse:  [] 85  Resp:  [8-37] 30  SpO2:  [90 %-100 %] 100 %  BP: ()/() 86/57   Weight: 77.1 kg (170 lb)  Body mass index is 24.39 kg/m².      Intake/Output Summary (Last 24 hours) at 12/30/2018 0857  Last data filed at 12/30/2018 0800  Gross per 24 hour   Intake 2756.11 ml   Output 629 ml   Net 2127.11 ml       Physical Exam   Constitutional: He appears well-developed. He appears cachectic. He has a sickly appearance. He is intubated.   HENT:   Head: Normocephalic.   Eyes: Pupils are equal, round, and reactive to light.   Neck: Trachea normal.   Cardiovascular: Regular rhythm and normal pulses. Tachycardia present.   Pulmonary/Chest: He is intubated. He has rhonchi in the right lower field and the left lower field.   Abdominal: Soft. Normal appearance. Bowel sounds are decreased.   Neurological: He is unresponsive. GCS eye subscore is 1. GCS verbal subscore is 1. GCS motor subscore is 1.   No cough or gag  Pupils 2 and reactive  Slight withdrawal to pain on upper extremities    Skin: Skin is warm and dry.   Nursing note and vitals reviewed.      Vents:  Vent Mode: A/C (12/30/18 0742)  Ventilator Initiated: Yes (12/29/18 1518)  Set Rate: 30 bmp (12/30/18 0742)  Vt Set: 450 mL (12/30/18  0742)  Pressure Support: 0 cmH20 (12/29/18 1720)  PEEP/CPAP: 5 cmH20 (12/30/18 0742)  Oxygen Concentration (%): 41 (12/30/18 0800)  Peak Airway Pressure: 26 cmH2O (12/30/18 0742)  Plateau Pressure: 0 cmH20 (12/30/18 0742)  Total Ve: 14.1 mL (12/30/18 0742)  F/VT Ratio<105 (RSBI): (!) 63.69 (12/30/18 0742)  Lines/Drains/Airways     Drain                 NG/OG Tube 12/29/18 1526 18 Fr. Right mouth less than 1 day         Urethral Catheter 12/29/18 1529 16 Fr. less than 1 day          Airway                 Airway - Non-Surgical 12/29/18 1505 Endotracheal Tube less than 1 day          Peripheral Intravenous Line                 Peripheral IV - Single Lumen 12/29/18 1504 Right Antecubital less than 1 day         Peripheral IV - Single Lumen 12/29/18 1504 Right Forearm less than 1 day         Peripheral IV - Single Lumen 12/29/18 1900 Anterior;Left Forearm less than 1 day              Significant Labs:    CBC/Anemia Profile:  Recent Labs   Lab 12/29/18  1513 12/30/18  0003 12/30/18  0405   WBC 8.91 1.05* 1.90*   HGB 14.6 14.4 14.3   HCT 45.9 44.3 41.9    121* 112*   * 97 94   RDW 13.1 13.2 13.3        Chemistries:  Recent Labs   Lab 12/29/18  1513 12/29/18 2008 12/30/18  0003 12/30/18  0405    135* 136 134*   K 3.8 4.0 3.8 3.9    102 104 104   CO2 16* 20* 21* 21*   BUN 11 11 12 13   CREATININE 1.1 0.9 0.9 1.0   CALCIUM 8.7 8.2* 7.8* 7.9*   ALBUMIN  --  3.1* 2.6* 2.6*   PROT  --  6.4 5.2* 5.2*   BILITOT  --  1.0 1.0 1.4*   ALKPHOS  --  99 75 63   ALT  --  48* 36 35   AST  --  102* 77* 71*   MG 2.6 1.7  --  1.3*   PHOS  --  3.7  --  1.1*       All pertinent labs within the past 24 hours have been reviewed.    Significant Imaging: I have reviewed and interpreted all pertinent imaging results/findings within the past 24 hours.

## 2018-12-30 NOTE — PLAN OF CARE
Problem: Adult Inpatient Plan of Care  Goal: Plan of Care Review  Outcome: Ongoing (interventions implemented as appropriate)  No acute events throughout shift. VS stable. Propofol gtt initiated. EEG in place. Neuro status unchanged. GCS status: 3. BEAU notified of patient and following. Plan of care is to maintain temperature <36 -unable to maintain goal despite cold blanket, tylenol, and ice pack application. Plan of care reviewed with pt and family. All questions and concerns addressed. Family verbalizes understanding.

## 2018-12-30 NOTE — ASSESSMENT & PLAN NOTE
Concern for seizures in ED  - loaded with Keppra   -Giiven prn Lorazepam.   -- EEG ordered and reportedly showed status. Now on Propofol. Will follow-up with Epileptology

## 2018-12-30 NOTE — ASSESSMENT & PLAN NOTE
"Spoke with the son in ED. Prognosis is very poor after "the patient's hear stopped for 20 mins". We explained that his brain may be affected but we would continue to monitor him closely in ICU for the next several hours during which time his prognosis would become clearer. His daughters were on their way to the hospital. Code status will need to be addressed when they arrive  "

## 2018-12-30 NOTE — H&P
[facility]  Critical Care Medicine  History & Physical    Patient Name: Ivan Balbuena  MRN: [id number]  Admission Date: 12/29/2018  Hospital Length of Stay: 0 days  Code Status: Full Code  Attending Physician: Cynthia Ribeiro MD   Primary Care Provider: Primary Doctor No   Principal Problem: Cardiac arrest    Subjective:     HPI:  Mr Balbuena is a 79 yo male with no significant PMH except alcohol and Marijuana abuse according to his son. He was found down in a bar today in PEA arrest. EMS report approximatley 20 mins of resuscitation until ROSC. The patient was intubated in the field. In ED the patient was found to be hypothermic(34.4), acidotic (6.9), and unresponsive.EKG showed ST depression in V2-V6 with about 1 mm ST elevation in AVR. Posterior EKG with no significant ST elevation in V4-V6. He was seen by Cardiology who did limited bedside echo that showed no significant effusion and hyperdynamic LV. They did not recommend any cardiac interventions. 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 A CTA Stroke showed no high-grade stenosis or major vessel occlusion. The patient did appear to be seizing and was loaded with Keppra and given Lorazepam.   Of note, the patient lives alone in an apartment in [place] and he goes to 3-4 local bars daily and drinks 1-2 beers at each one. He also smokes Marijuana daily. The patient has one son and two daughters. His son has recently been treated for multiple myeloma( Dr Ragsdale) . One daughter lives in [facility] and the other is in [place].         Hospital/ICU Course:  No notes on file     No past medical history on file.    No past surgical history on file.    Review of patient's allergies indicates:  No Known Allergies    Family History      None        Tobacco Use    Smoking status: Not on file   Substance and Sexual Activity    Alcohol use: Not on file    Drug use: Not on file    Sexual activity: Not on file      Review of Systems   Unable to perform ROS: Intubated     Objective:     Vital Signs (Most Recent):  Temp: (!) 95.7 °F (35.4 °C) (12/29/18 1830)  Pulse: (!) 126 (12/29/18 1804)  Resp: (!) 37 (12/29/18 1804)  BP: (!) 166/101 (12/29/18 1804)  SpO2: 95 % (12/29/18 1804) Vital Signs (24h Range):  Temp:  [94.2 °F (34.6 °C)-95.7 °F (35.4 °C)] 95.7 °F (35.4 °C)  Pulse:  [102-126] 126  Resp:  [8-37] 37  SpO2:  [93 %-100 %] 95 %  BP: ()/() 166/101   Weight: 79.4 kg (175 lb)  Body mass index is 25.11 kg/m².      Intake/Output Summary (Last 24 hours) at 12/29/2018 1841  Last data filed at 12/29/2018 1829  Gross per 24 hour   Intake 0 ml   Output 100 ml   Net -100 ml       Physical Exam   Constitutional: He appears well-developed. He appears cachectic. He has a sickly appearance. He is intubated.   HENT:   Head: Normocephalic.   Eyes: Pupils are equal, round, and reactive to light.   Neck: Trachea normal.   Cardiovascular: Regular rhythm and normal pulses. Tachycardia present.   Pulmonary/Chest: He is intubated. He has rhonchi in the right lower field and the left lower field.   Abdominal: Soft. Normal appearance. Bowel sounds are decreased.   Neurological: He is unresponsive. GCS eye subscore is 1. GCS verbal subscore is 1. GCS motor subscore is 1.   Patient began to twitch/seize when a light was shone in his eyes  Pupils 3 and reactive   Skin: Skin is warm and dry.   Nursing note and vitals reviewed.      Vents:  Vent Mode: A/C (12/29/18 1753)  Ventilator Initiated: Yes (12/29/18 1518)  Set Rate: 20 bmp (12/29/18 1753)  Vt Set: 450 mL (12/29/18 1753)  Pressure Support: 0 cmH20 (12/29/18 1720)  PEEP/CPAP: 5 cmH20 (12/29/18 1753)  Oxygen Concentration (%): 50 (12/29/18 1804)  Peak Airway Pressure: 26 cmH2O (12/29/18 1753)  Plateau  Pressure: 0 cmH20 (12/29/18 1753)  Total Ve: 17.8 mL (12/29/18 1753)  F/VT Ratio<105 (RSBI): (!) 59.13 (12/29/18 1720)  Lines/Drains/Airways     Drain                 NG/OG Tube 12/29/18 1526 18 Fr. Right mouth less than 1 day         Urethral Catheter 12/29/18 1529 16 Fr. less than 1 day          Airway                 Airway - Non-Surgical 12/29/18 1505 Endotracheal Tube less than 1 day          Peripheral Intravenous Line                 Peripheral IV - Single Lumen 12/29/18 1504 Right Antecubital less than 1 day         Peripheral IV - Single Lumen 12/29/18 1504 Right Forearm less than 1 day              Significant Labs:    CBC/Anemia Profile:  Recent Labs   Lab 12/29/18  1513   WBC 8.91   HGB 14.6   HCT 45.9      *   RDW 13.1        Chemistries:  Recent Labs   Lab 12/29/18  1513      K 3.8      CO2 16*   BUN 11   CREATININE 1.1   CALCIUM 8.7   MG 2.6       All pertinent labs within the past 24 hours have been reviewed.    Significant Imaging: I have reviewed and interpreted all pertinent imaging results/findings within the past 24 hours.    Assessment/Plan:     Neuro   Seizure in response to acute event    Concern for seizures in ED  - loaded with Keppra   -Giiven prn Lorazepam.   -- EEG ordered       Acute encephalopathy    Patient unresponsive on admission to ED  -- CT head 12/29 showed diffuse loss of gray-white differentiation throughout the supratentorial and infratentorial cerebral parenchyma suspicious for acute anoxic brain injury and suspicion of acute thrombosis in the basilar artery and proximal right MCA.  -- NCC was consulted but do not think this was a neurological event  -- A CTA Stroke showed no high-grade stenosis or major vessel occlusion.      Psychiatric   Alcohol withdrawal    At risk for alcohol withdrawal (level 114 in ED)  -- Banana bag  -- Thiamine and folic acid daily  -- Monitor CIWA-AR q shift       Pulmonary   Respiratory failure requiring intubation     "Intubated in ED for airway protection  -- Wean vent as tolerated     Cardiac/Vascular   * Cardiac arrest    PEA arrest of unknown etiology  -- down time ~20 mins  -- Maintain normothermia. Place cooling blanket if patient's temperature goes above 36 degrees     PEA (Pulseless electrical activity)    See below     Other   Goals of care, counseling/discussion    Spoke with the son in ED. Prognosis is very poor after "the patient's hear stopped for 20 mins". We explained that his brain may be affected but we would continue to monitor him closely in ICU for the next several hours during which time his prognosis would become clearer. His daughters were on their way to the hospital. Code status will need to be addressed when they arrive         Critical Care Daily Checklist:    A: Awake: RASS Goal/Actual Goal:    Actual: Gutiérrez Agitation Sedation Scale (RASS): Unarousable   B: Spontaneous Breathing Trial Performed?  NA   C: SAT & SBT Coordinated?  NA                  D: Delirium: CAM-ICU     E: Early Mobility Performed? No   F: Feeding Goal:    Status:     Current Diet Order   Procedures    Diet NPO      AS: Analgesia/Sedation Precedex   T: Thromboembolic Prophylaxis SCDs   H: HOB > 300 Yes   U: Stress Ulcer Prophylaxis (if needed) Famotidine   G: Glucose Control prn   B: Bowel Function     I: Indwelling Catheter (Lines & Hines) Necessity Hines   D: De-escalation of Antimicrobials/Pharmacotherapies Initiate    Plan for the day/ETD Supportive care    Code Status:  Family/Goals of Care: Full Code  updated son in ED     Critical Care Time: 55 minutes  Critical secondary to Patient has a condition that poses threat to life and bodily function: Cardiac arrest     Critical care was time spent personally by me on the following activities: development of treatment plan with patient or surrogate and bedside caregivers, discussions with consultants, evaluation of patient's response to treatment, examination of patient, ordering " and performing treatments and interventions, ordering and review of laboratory studies, ordering and review of radiographic studies, pulse oximetry, re-evaluation of patient's condition. This critical care time did not overlap with that of any other provider or involve time for any procedures.     Fiona Winterbottom, APRN, CNS  Critical Care Medicine  [facility]

## 2018-12-30 NOTE — CONSULTS
"  [facility]  Adult Nutrition  Consult Note    SUMMARY     Recommendations  Recommendation/Intervention:   1. As medically able, recommend initiating TF.    -If remains on propofol, recommend Peptamen Intense VHP at a goal rate of 50 mL/hr - to provide 1200 kcal/day (1765 kcal w/propofol), 110g protein/day, and 1008mL free fluid/day.    -If no longer on propofol, recommend Impact Peptide 1.5 at a goal rate of 50 mL/hr - to provide 1800 kcal/day, 113g protein/day, and 924mL free fluid/day.   2. When able to extubate, ADAT to Regular with texture per SLP.   RD to monitor.    Goals: Patient to receive nutrition by RD follow-up  Nutrition Goal Status: new  Communication of RD Recs: (POC)    Reason for Assessment  Reason For Assessment: consult  Diagnosis: (cardiac arrest)  Relevant Medical History: alcohol and marijuana abuse  General Information Comments: Patient intubated, sedated. Plan to start TF. (Patient currently with no physical signs of malnutrition however unable to determine patient's UBW and PO intake PTA. Unable to determine if patient meets criteria for malnutrition at this time.)  Nutrition Discharge Planning: Unable to determine at this time.    Nutrition Risk Screen  Nutrition Risk Screen: no indicators present    Nutrition/Diet History  Spiritual, Cultural Beliefs, Islam Practices, Values that Affect Care: no  Factors Affecting Nutritional Intake: NPO, on mechanical ventilation    Anthropometrics  Temp: 99 °F (37.2 °C)  Height Method: Estimated  Height: 5' 10" (177.8 cm)  Height (inches): 70 in  Weight Method: Bed Scale  Weight: 77.1 kg (170 lb)  Weight (lb): 170 lb  Ideal Body Weight (IBW), Male: 166 lb  % Ideal Body Weight, Male (lb): 102.41 lb  BMI (Calculated): 24.4  BMI Grade: 18.5-24.9 - normal    Lab/Procedures/Meds  Pertinent Labs Reviewed: reviewed  Pertinent Labs Comments: Na 134, Ca 7.9, Phos 1.1, Mag 1.3, Alb 2.6  Pertinent Medications Reviewed: reviewed  Pertinent " Medications Comments: famotidine, folic acid, thiamine, propofol    Estimated/Assessed Needs  Weight Used For Calorie Calculations: 77.1 kg (169 lb 15.6 oz)  Energy Calorie Requirements (kcal): 1895 kcal/day  Energy Need Method: [facility]  Protein Requirements:  g/day(1.2-1.5 g/kg)  Weight Used For Protein Calculations: 77.1 kg (169 lb 15.6 oz)  Fluid Requirements (mL): 1 mL/kcal or per MD  Estimated Fluid Requirement Method: RDA Method  RDA Method (mL): 1895    Nutrition Prescription Ordered  Current Diet Order: NPO    Evaluation of Received Nutrient/Fluid Intake  Other Calories (kcal): 565(propofol)  I/O: +2.1L x 24hrs  Comments: No BM recorded  % Intake of Estimated Energy Needs: 0 - 25 %  % Meal Intake: NPO    Nutrition Risk  Level of Risk/Frequency of Follow-up: high(2x/week)     Assessment and Plan  Nutrition Problem  Inadequate energy intake    Related to (etiology):   Decreased ability to consume sufficient energy    Signs and Symptoms (as evidenced by):   NPO, intubated with no alternative means of nutrition at this time     Interventions/Recommendations (treatment strategy):  Initiate enteral nutrition    Nutrition Diagnosis Status:   New    Monitor and Evaluation  Food and Nutrient Intake: energy intake, enteral nutrition intake  Food and Nutrient Adminstration: enteral and parenteral nutrition administration  Anthropometric Measurements: weight, weight change  Biochemical Data, Medical Tests and Procedures: electrolyte and renal panel, gastrointestinal profile, inflammatory profile  Nutrition-Focused Physical Findings: overall appearance     Nutrition Follow-Up  RD Follow-up?: Yes

## 2018-12-30 NOTE — PLAN OF CARE
Problem: Adult Inpatient Plan of Care  Goal: Plan of Care Review  Recommendations  Recommendation/Intervention:   1. As medically able, recommend initiating TF.    -If remains on propofol, recommend Peptamen Intense VHP at a goal rate of 50 mL/hr - to provide 1200 kcal/day (1765 kcal w/propofol), 110g protein/day, and 1008mL free fluid/day.    -If no longer on propofol, recommend Impact Peptide 1.5 at a goal rate of 50 mL/hr - to provide 1800 kcal/day, 113g protein/day, and 924mL free fluid/day.   2. When able to extubate, ADAT to Regular with texture per SLP.   RD to monitor.    Goals: Patient to receive nutrition by RD follow-up  Nutrition Goal Status: new    Full assessment completed, see RD Note 12/30/2018.

## 2018-12-31 LAB
ALLENS TEST: ABNORMAL
ALLENS TEST: ABNORMAL
HCT VFR BLD CALC: 43 %PCV (ref 36–54)
LDH SERPL L TO P-CCNC: 10.62 MMOL/L (ref 0.36–1.25)
POC IONIZED CALCIUM: 1.19 MMOL/L (ref 1.06–1.42)
POTASSIUM BLD-SCNC: 2.8 MMOL/L (ref 3.5–5.1)
PROVIDER CREDENTIALS: ABNORMAL
PROVIDER NOTIFIED: ABNORMAL
SAMPLE: ABNORMAL
SAMPLE: ABNORMAL
SITE: ABNORMAL
SITE: ABNORMAL
SODIUM BLD-SCNC: 134 MMOL/L (ref 136–145)

## 2018-12-31 NOTE — SIGNIFICANT EVENT
Death Note    Called to bedside by patient's nurse. Nursing supervisor notified. Family not at bedside.  has been called.    Patient is not responding to verbal or tactile stimuli. Patient does not have a papillary or corneal reflex. His pupils are fixed and dilated. No heart or breath sounds on auscultation. No respirations. No palpable pulses.     Time of death: 1755 12/30/2018     Cause of Death: Cardiopulmonary Failure due to Cardiac Arrest       Machelle Ramos MD  Internal Medicine, PGY1  Pager:  [phone]   Email: [email or web address]

## 2018-12-31 NOTE — DISCHARGE SUMMARY
Death Note  Critical Care Medicine      Admit Date: 2018    Date of Death: 2018    Time of Death: 17:55    Attending Physician: Cynthia Ribeiro MD    Principal Diagnoses: Cardiac arrest    Preliminary Cause of Death: Cardiac arrest    Secondary Diagnoses:   Active Hospital Problems    Diagnosis  POA    *Cardiac arrest [I46.9]  Yes    PEA (Pulseless electrical activity) [I46.9]  Unknown    Alcohol withdrawal [F10.239]  Unknown    Respiratory failure requiring intubation [J96.90]  Yes    Acute encephalopathy [G93.40]  Yes    Seizure in response to acute event [R56.9]  Yes    Goals of care, counseling/discussion [Z71.89]  Not Applicable      Resolved Hospital Problems   No resolved problems to display.        Discharged Condition:     HPI:  Mr Balbuena is a 79 yo male with no significant PMH except alcohol and Marijuana abuse according to his son. He was found down in a bar today in PEA arrest. EMS report approximatley 20 mins of resuscitation until ROSC. The patient was intubated in the field. In ED the patient was found to be hypothermic(34.4), acidotic (6.9), and unresponsive.EKG showed ST depression in V2-V6 with about 1 mm ST elevation in AVR. Posterior EKG with no significant ST elevation in V4-V6. He was seen by Cardiology who did limited bedside echo that showed no significant effusion and hyperdynamic LV. They did not recommend any cardiac interventions. 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 A CTA Stroke showed no high-grade stenosis or major vessel occlusion. The patient did appear to be seizing and was loaded with Keppra and given Lorazepam.   Of note, the patient lives alone in an apartment in [place] and he goes to 3-4 local bars daily and drinks 1-2  beers at each one. He also smokes Marijuana daily. The patient has one son and two daughters. His son has recently been treated for multiple myeloma( Dr Ragsdale) . One daughter lives in [facility] and the other is in [place].     Hospital/ICU Course:  Patient admitted to [facility] for post cardiac care. EEG showed spontaneous burst suppression with myoclonic epileptiform discharge which carries a guarded prognosis in the context anoxic brain injury.    Consultations were held with the family regarding the patient's  poor prognosis. The family brought a document that stated the patient would want comfort care in the event of devastating medical event rather than prolonged life support.  Measures to ensure the comfort of the patient including morphine for pain and air hunger as well as benzodiazepines for agitation and seizure suppression were initiated. Propofol was discontinued and the patient was extubated when he appeared comfortable. He was surrounded by his family at his bedside. The patient was subsequently declared dead at 17:55 on 12/30/2018    Fiona Winterbottom APRN, CNS  Critical Care Medicine  [phone]

## 2019-01-04 NOTE — PHYSICIAN QUERY
PT Name: Ivan Balbuena  MR #: [id number]    Physician Query Form - Respiratory Condition Clarification      CDS/: Virginia Vallecillo RN              Contact information: [phone]    This form is a permanent document in the medical record.    Query Date: January 4, 2019    By submitting this query, we are merely seeking further clarification of documentation. Please utilize your independent clinical judgment when addressing the question(s) below.    The Medical record contains the following   Indicators   Supporting Clinical Findings Location in Medical Record      SOB, HYATT, Wheezing, Productive Cough, Use of Accessory Muscles, etc.     x   Acute/Chronic Illness Cardiac arrest    H&P 12/29   x   Radiology Findings Impression      Right middle lung zone and left lower lung zone airspace opacities, suspicious for edema or infectious/inflammatory disease. Chest xray 12/29   x   Respiratory Distress or Failure Respiratory failure requiring intubation Intubated in ED for airway protection   -- Wean vent as tolerated    H&P 12/29   x   Hypoxia or Hypercapnia Hypoxic encephalopathy with seizures.       H&P 12/29   x   RR         ABGs         O2 sat Vital Signs (24h Range):   Temp:  [94.2 °F (34.6 °C)-95.7 °F (35.4 °C)] 95.7 °F (35.4 °C)   Pulse:  [102-126] 126   Resp:  [8-37] 37   SpO2:  [93 %-100 %] 95 %       ABG     Recent Labs :    Lab   12/30/18   0342     PH   7.413     PO2   73*     PCO2   27.4*     HCO3   17.5*     BE   -7   H&P 12/29                Progress Note 12/30       Critical Care Medicine                          x   BiPAP/Intubation EMS report approximatley 20 mins  of resuscitation until ROSC. The patient was intubated in the field.    Progress Note 12/30      Critical Care Medicine             Supplemental O2        Home O2, Oxygen Dependence        Treatment        Other       Respiratory failure can be acute, chronic or both. It is generally further specified as hypoxic, hypercapnic or both. Lastly,  it is important to identify an etiology, if known or suspected.   References:: https://www.acphospitalist.org/archives/2013/10/coding; htm; http://[employer]/acute-respiratory-failure-know    The clinical guidelines noted below are only system guidelines, and do not replace the providers clinical judgment.    Provider, please specify diagnosis or diagnoses associated with above clinical findings.   [  x ] Acute Respiratory Failure with Hypoxia - ABG pO2 < 60 mmHg or O2 sat of 88% on RA and respiratory symptoms documented   [   ] Other Acute Respiratory Failure     [   ] Other Respiratory Diagnosis (please specify): _________________________________   [   ]  Clinically Undetermined       Please document in your progress notes daily for the duration of treatment until resolved and include in your discharge summary.